# Patient Record
Sex: MALE | Race: WHITE | NOT HISPANIC OR LATINO | URBAN - METROPOLITAN AREA
[De-identification: names, ages, dates, MRNs, and addresses within clinical notes are randomized per-mention and may not be internally consistent; named-entity substitution may affect disease eponyms.]

---

## 2018-12-22 ENCOUNTER — OFFICE VISIT (OUTPATIENT)
Dept: URGENT CARE | Facility: CLINIC | Age: 68
End: 2018-12-22
Payer: MEDICARE

## 2018-12-22 VITALS
TEMPERATURE: 96 F | WEIGHT: 170 LBS | HEIGHT: 66 IN | SYSTOLIC BLOOD PRESSURE: 119 MMHG | BODY MASS INDEX: 27.32 KG/M2 | RESPIRATION RATE: 18 BRPM | DIASTOLIC BLOOD PRESSURE: 69 MMHG | HEART RATE: 59 BPM | OXYGEN SATURATION: 99 %

## 2018-12-22 DIAGNOSIS — R09.81 NASAL CONGESTION: ICD-10-CM

## 2018-12-22 DIAGNOSIS — H66.001 ACUTE SUPPURATIVE OTITIS MEDIA OF RIGHT EAR WITHOUT SPONTANEOUS RUPTURE OF TYMPANIC MEMBRANE, RECURRENCE NOT SPECIFIED: Primary | ICD-10-CM

## 2018-12-22 DIAGNOSIS — R05.9 COUGH: ICD-10-CM

## 2018-12-22 PROCEDURE — 99214 OFFICE O/P EST MOD 30 MIN: CPT | Mod: S$GLB,,, | Performed by: SURGERY

## 2018-12-22 RX ORDER — ESCITALOPRAM OXALATE 20 MG/1
20 TABLET ORAL DAILY
COMMUNITY

## 2018-12-22 RX ORDER — CLOPIDOGREL BISULFATE 75 MG/1
75 TABLET ORAL DAILY
COMMUNITY

## 2018-12-22 RX ORDER — LISINOPRIL 10 MG/1
10 TABLET ORAL 2 TIMES DAILY
COMMUNITY
End: 2019-02-18

## 2018-12-22 RX ORDER — AMOXICILLIN AND CLAVULANATE POTASSIUM 875; 125 MG/1; MG/1
1 TABLET, FILM COATED ORAL 2 TIMES DAILY
Qty: 14 TABLET | Refills: 0 | Status: SHIPPED | OUTPATIENT
Start: 2018-12-22 | End: 2018-12-29

## 2018-12-22 RX ORDER — IPRATROPIUM BROMIDE 42 UG/1
2 SPRAY, METERED NASAL 4 TIMES DAILY
Qty: 15 ML | Refills: 0 | Status: SHIPPED | OUTPATIENT
Start: 2018-12-22 | End: 2019-01-22 | Stop reason: SDUPTHER

## 2018-12-22 RX ORDER — AMLODIPINE BESYLATE 10 MG/1
10 TABLET ORAL DAILY
COMMUNITY

## 2018-12-22 RX ORDER — BENZONATATE 200 MG/1
200 CAPSULE ORAL 3 TIMES DAILY PRN
Qty: 21 CAPSULE | Refills: 0 | Status: SHIPPED | OUTPATIENT
Start: 2018-12-22 | End: 2018-12-29

## 2018-12-22 RX ORDER — OMEPRAZOLE 20 MG/1
20 CAPSULE, DELAYED RELEASE ORAL DAILY
COMMUNITY
End: 2019-02-18

## 2018-12-22 RX ORDER — ASPIRIN 81 MG/1
81 TABLET ORAL DAILY
COMMUNITY

## 2018-12-22 NOTE — PATIENT INSTRUCTIONS
Middle Ear Infection (Adult)  You have an infection of the middle ear, the space behind the eardrum. This is also called acute otitis media (AOM). Sometimes it is caused by the common cold. This is because congestion can block the internal passage (eustachian tube) that drains fluid from the middle ear. When the middle ear fills with fluid, bacteria can grow there and cause an infection. Oral antibiotics are used to treat this illness, not ear drops. Symptoms usually start to improve within 1 to 2 days of treatment.    Home care  The following are general care guidelines:  · Finish all of the antibiotic medicine given, even though you may feel better after the first few days.  · You may use over-the-counter medicine, such as acetaminophen or ibuprofen, to control pain and fever, unless something else was prescribed. If you have chronic liver or kidney disease or have ever had a stomach ulcer or gastrointestinal bleeding, talk with your healthcare provider before using these medicines. Do not give aspirin to anyone under 18 years of age who has a fever. It may cause severe illness or death.  Follow-up care  Follow up with your healthcare provider, or as advised, in 2 weeks if all symptoms have not gotten better, or if hearing doesn't go back to normal within 1 month.  When to seek medical advice  Call your healthcare provider right away if any of these occur:  · Ear pain gets worse or does not improve after 3 days of treatment  · Unusual drowsiness or confusion  · Neck pain, stiff neck, or headache  · Fluid or blood draining from the ear canal  · Fever of 100.4°F (38°C) or as advised   · Seizure  Date Last Reviewed: 6/1/2016  © 3149-8380 [employer]. [address] [place] [address], [place] [address]. All rights reserved. This information is not intended as a substitute for professional medical care. Always follow your healthcare professional's instructions.

## 2018-12-22 NOTE — PROGRESS NOTES
"Subjective:       Patient ID: Eric Pink is a 68 y.o. male.    Vitals:  height is 5' 6" (1.676 m) and weight is 77.1 kg (170 lb). His temperature is 96 °F (35.6 °C). His blood pressure is 119/69 and his pulse is 59 (abnormal). His respiration is 18 and oxygen saturation is 99%.     Chief Complaint: Cough    Pt states that his symptoms began yesterday and gradually worsening .Took coridin hp and helped sleep but did not help symtpoms. Right ear ache, stuffy nose and cough      Cough   This is a new problem. The current episode started yesterday. The problem has been gradually worsening. The problem occurs constantly. The cough is productive of sputum (light green). Associated symptoms include ear pain, myalgias and a sore throat. Pertinent negatives include no chills, eye redness, fever, hemoptysis, rash, shortness of breath or wheezing. He has tried OTC cough suppressant for the symptoms. The treatment provided mild relief.       Constitution: Negative for chills, sweating, fatigue and fever.   HENT: Positive for ear pain, congestion, sinus pain and sore throat. Negative for sinus pressure and voice change.    Neck: Negative for painful lymph nodes.   Eyes: Positive for eye itching. Negative for eye redness.   Respiratory: Positive for cough and sputum production. Negative for chest tightness, bloody sputum, COPD, shortness of breath, stridor, wheezing and asthma.    Gastrointestinal: Negative for nausea and vomiting.   Musculoskeletal: Positive for muscle ache.   Skin: Negative for rash.   Allergic/Immunologic: Negative for seasonal allergies and asthma.   Hematologic/Lymphatic: Negative for swollen lymph nodes.       Objective:      Physical Exam   Constitutional: He is oriented to person, place, and time. He appears well-developed and well-nourished. He is cooperative.  Non-toxic appearance. He does not appear ill. No distress.   HENT:   Head: Normocephalic and atraumatic.   Right Ear: Hearing, external " ear and ear canal normal. Tympanic membrane is injected and erythematous.   Left Ear: Hearing, tympanic membrane, external ear and ear canal normal.   Nose: Mucosal edema and rhinorrhea present. No nasal deformity. No epistaxis. Right sinus exhibits no maxillary sinus tenderness and no frontal sinus tenderness. Left sinus exhibits no maxillary sinus tenderness and no frontal sinus tenderness.   Mouth/Throat: Uvula is midline, oropharynx is clear and moist and mucous membranes are normal. No trismus in the jaw. Normal dentition. No uvula swelling. No posterior oropharyngeal erythema.   Eyes: Conjunctivae and lids are normal. No scleral icterus.   Sclera clear bilat   Neck: Trachea normal, full passive range of motion without pain and phonation normal. Neck supple.   Cardiovascular: Normal rate, regular rhythm, normal heart sounds, intact distal pulses and normal pulses.   Pulmonary/Chest: Effort normal and breath sounds normal. No respiratory distress.   Occasional dry cough   Abdominal: Soft. Normal appearance and bowel sounds are normal. He exhibits no distension. There is no tenderness.   Musculoskeletal: Normal range of motion. He exhibits no edema or deformity.   Neurological: He is alert and oriented to person, place, and time. He exhibits normal muscle tone. Coordination normal.   Skin: Skin is warm, dry and intact. He is not diaphoretic. No pallor.   Psychiatric: He has a normal mood and affect. His speech is normal and behavior is normal. Judgment and thought content normal. Cognition and memory are normal.   Nursing note and vitals reviewed.      Assessment:       1. Acute suppurative otitis media of right ear without spontaneous rupture of tympanic membrane, recurrence not specified    2. Cough    3. Nasal congestion        Plan:         Acute suppurative otitis media of right ear without spontaneous rupture of tympanic membrane, recurrence not specified  -     amoxicillin-clavulanate 875-125mg (AUGMENTIN)  875-125 mg per tablet; Take 1 tablet by mouth 2 (two) times daily. for 7 days  Dispense: 14 tablet; Refill: 0    Cough  -     benzonatate (TESSALON) 200 MG capsule; Take 1 capsule (200 mg total) by mouth 3 (three) times daily as needed for Cough.  Dispense: 21 capsule; Refill: 0    Nasal congestion  -     ipratropium (ATROVENT) 42 mcg (0.06 %) nasal spray; 2 sprays by Nasal route 4 (four) times daily.  Dispense: 15 mL; Refill: 0      Patient Instructions     Middle Ear Infection (Adult)  You have an infection of the middle ear, the space behind the eardrum. This is also called acute otitis media (AOM). Sometimes it is caused by the common cold. This is because congestion can block the internal passage (eustachian tube) that drains fluid from the middle ear. When the middle ear fills with fluid, bacteria can grow there and cause an infection. Oral antibiotics are used to treat this illness, not ear drops. Symptoms usually start to improve within 1 to 2 days of treatment.    Home care  The following are general care guidelines:  · Finish all of the antibiotic medicine given, even though you may feel better after the first few days.  · You may use over-the-counter medicine, such as acetaminophen or ibuprofen, to control pain and fever, unless something else was prescribed. If you have chronic liver or kidney disease or have ever had a stomach ulcer or gastrointestinal bleeding, talk with your healthcare provider before using these medicines. Do not give aspirin to anyone under 18 years of age who has a fever. It may cause severe illness or death.  Follow-up care  Follow up with your healthcare provider, or as advised, in 2 weeks if all symptoms have not gotten better, or if hearing doesn't go back to normal within 1 month.  When to seek medical advice  Call your healthcare provider right away if any of these occur:  · Ear pain gets worse or does not improve after 3 days of treatment  · Unusual drowsiness or  confusion  · Neck pain, stiff neck, or headache  · Fluid or blood draining from the ear canal  · Fever of 100.4°F (38°C) or as advised   · Seizure  Date Last Reviewed: 6/1/2016  © 4006-5250 [employer]. [address] [place] [address], [place] [address]. All rights reserved. This information is not intended as a substitute for professional medical care. Always follow your healthcare professional's instructions.

## 2019-01-22 DIAGNOSIS — R09.81 NASAL CONGESTION: ICD-10-CM

## 2019-01-22 RX ORDER — IPRATROPIUM BROMIDE 42 UG/1
SPRAY, METERED NASAL
Qty: 15 ML | Refills: 0 | Status: SHIPPED | OUTPATIENT
Start: 2019-01-22 | End: 2019-02-18

## 2019-02-18 ENCOUNTER — OFFICE VISIT (OUTPATIENT)
Dept: URGENT CARE | Facility: CLINIC | Age: 69
End: 2019-02-18
Payer: MEDICARE

## 2019-02-18 VITALS
HEART RATE: 69 BPM | TEMPERATURE: 99 F | DIASTOLIC BLOOD PRESSURE: 73 MMHG | SYSTOLIC BLOOD PRESSURE: 125 MMHG | BODY MASS INDEX: 27.44 KG/M2 | WEIGHT: 170 LBS | OXYGEN SATURATION: 97 %

## 2019-02-18 DIAGNOSIS — R52 BODY ACHES: ICD-10-CM

## 2019-02-18 DIAGNOSIS — J10.1 INFLUENZA A: Primary | ICD-10-CM

## 2019-02-18 DIAGNOSIS — J01.40 ACUTE NON-RECURRENT PANSINUSITIS: ICD-10-CM

## 2019-02-18 LAB
CTP QC/QA: YES
FLUAV AG NPH QL: POSITIVE
FLUBV AG NPH QL: NEGATIVE

## 2019-02-18 PROCEDURE — 99214 OFFICE O/P EST MOD 30 MIN: CPT | Mod: S$GLB,,, | Performed by: NURSE PRACTITIONER

## 2019-02-18 PROCEDURE — 99214 PR OFFICE/OUTPT VISIT, EST, LEVL IV, 30-39 MIN: ICD-10-PCS | Mod: S$GLB,,, | Performed by: NURSE PRACTITIONER

## 2019-02-18 PROCEDURE — 87804 INFLUENZA ASSAY W/OPTIC: CPT | Mod: QW,S$GLB,, | Performed by: NURSE PRACTITIONER

## 2019-02-18 PROCEDURE — 87804 POCT INFLUENZA A/B: ICD-10-PCS | Mod: QW,S$GLB,, | Performed by: NURSE PRACTITIONER

## 2019-02-18 RX ORDER — ATORVASTATIN CALCIUM 80 MG/1
80 TABLET, FILM COATED ORAL DAILY
Refills: 2 | COMMUNITY
Start: 2018-11-13

## 2019-02-18 RX ORDER — PANTOPRAZOLE SODIUM 40 MG/1
40 TABLET, DELAYED RELEASE ORAL DAILY
Refills: 3 | COMMUNITY
Start: 2018-12-28

## 2019-02-18 RX ORDER — AMOXICILLIN AND CLAVULANATE POTASSIUM 875; 125 MG/1; MG/1
1 TABLET, FILM COATED ORAL 2 TIMES DAILY
Qty: 14 TABLET | Refills: 0 | Status: SHIPPED | OUTPATIENT
Start: 2019-02-18 | End: 2019-02-25

## 2019-02-18 RX ORDER — ONDANSETRON 4 MG/1
4 TABLET, ORALLY DISINTEGRATING ORAL EVERY 6 HOURS PRN
Qty: 12 TABLET | Refills: 0 | Status: SHIPPED | OUTPATIENT
Start: 2019-02-18

## 2019-02-18 RX ORDER — OSELTAMIVIR PHOSPHATE 75 MG/1
75 CAPSULE ORAL 2 TIMES DAILY
Qty: 10 CAPSULE | Refills: 0 | Status: SHIPPED | OUTPATIENT
Start: 2019-02-18 | End: 2019-02-23

## 2019-02-18 RX ORDER — PROMETHAZINE HYDROCHLORIDE AND DEXTROMETHORPHAN HYDROBROMIDE 6.25; 15 MG/5ML; MG/5ML
5 SYRUP ORAL NIGHTLY PRN
Qty: 120 ML | Refills: 0 | Status: SHIPPED | OUTPATIENT
Start: 2019-02-18 | End: 2019-02-28

## 2019-02-18 NOTE — PROGRESS NOTES
Subjective:       Patient ID: Eric Pink is a 68 y.o. male.    Vitals:  weight is 77.1 kg (170 lb). His temperature is 98.7 °F (37.1 °C). His blood pressure is 125/73 and his pulse is 69. His oxygen saturation is 97%.     Chief Complaint: URI    Patient states he was seen a few weeks ago and his symptoms have not gotten any better they actually became worse yesterday.  Does state that he was exposed to the flu is his granddaughter is ill.  Did receive his flu vaccine this season.  Cough is keeping him awake at night.      URI    This is a new problem. The current episode started yesterday (3). The problem has been rapidly worsening. There has been no fever. Associated symptoms include congestion, coughing, ear pain, headaches and sinus pain. Pertinent negatives include no chest pain, diarrhea, dysuria, nausea, rash, sore throat or vomiting. He has tried nothing for the symptoms.       Constitution: Positive for fatigue. Negative for chills and fever.   HENT: Positive for ear pain, congestion, postnasal drip, sinus pain and sinus pressure. Negative for sore throat.         Fluid in ears   Neck: Negative for painful lymph nodes.   Cardiovascular: Negative for chest pain and leg swelling.   Eyes: Negative for double vision and blurred vision.   Respiratory: Positive for cough. Negative for shortness of breath.    Gastrointestinal: Negative for nausea, vomiting and diarrhea.   Genitourinary: Negative for dysuria, frequency and urgency.   Musculoskeletal: Positive for muscle ache. Negative for joint pain, joint swelling and muscle cramps.   Skin: Negative for color change, pale and rash.   Allergic/Immunologic: Negative for seasonal allergies.   Neurological: Positive for headaches. Negative for dizziness, history of vertigo, light-headedness and passing out.   Hematologic/Lymphatic: Negative for swollen lymph nodes, easy bruising/bleeding and history of blood clots. Does not bruise/bleed easily.    Psychiatric/Behavioral: Negative for nervous/anxious, sleep disturbance and depression. The patient is not nervous/anxious.        Objective:      Physical Exam   Constitutional: He is oriented to person, place, and time. Vital signs are normal. He appears well-developed and well-nourished. He is cooperative.  Non-toxic appearance. He does not have a sickly appearance. He does not appear ill. No distress.   HENT:   Head: Normocephalic and atraumatic.   Right Ear: Hearing, external ear and ear canal normal. A middle ear effusion is present.   Left Ear: Hearing, external ear and ear canal normal. A middle ear effusion is present.   Nose: Mucosal edema and rhinorrhea present. No nasal deformity. No epistaxis. Right sinus exhibits maxillary sinus tenderness and frontal sinus tenderness. Left sinus exhibits maxillary sinus tenderness and frontal sinus tenderness.   Mouth/Throat: Uvula is midline and mucous membranes are normal. No trismus in the jaw. Normal dentition. No uvula swelling. Posterior oropharyngeal erythema present.   Patient states symptoms never cleared up from his last visit a few weeks ago and thinks that the sinus infection is left over from that.   Eyes: Conjunctivae, EOM and lids are normal. Pupils are equal, round, and reactive to light. No scleral icterus.   Sclera clear bilat   Neck: Trachea normal, normal range of motion, full passive range of motion without pain and phonation normal. Neck supple. No spinous process tenderness and no muscular tenderness present. No neck rigidity. Normal range of motion present.   Cardiovascular: Normal rate, regular rhythm, normal heart sounds and normal pulses.   Pulmonary/Chest: Effort normal and breath sounds normal. No respiratory distress.   Abdominal: Soft. Normal appearance and bowel sounds are normal. He exhibits no distension. There is no tenderness.   Musculoskeletal: Normal range of motion. He exhibits no edema or deformity.   Lymphadenopathy:     He  has cervical adenopathy.        Right cervical: Superficial cervical adenopathy present.        Left cervical: Superficial cervical adenopathy present.   Neurological: He is alert and oriented to person, place, and time. He exhibits normal muscle tone. Coordination normal.   Skin: Skin is warm, dry and intact. Capillary refill takes less than 2 seconds. He is not diaphoretic. No pallor.   Psychiatric: He has a normal mood and affect. His speech is normal and behavior is normal. Judgment and thought content normal. Cognition and memory are normal.   Nursing note and vitals reviewed.      Results for orders placed or performed in visit on 02/18/19   POCT Influenza A/B   Result Value Ref Range    Rapid Influenza A Ag Positive (A) Negative    Rapid Influenza B Ag Negative Negative     Acceptable Yes      Assessment:       1. Influenza A    2. Acute non-recurrent pansinusitis    3. Body aches        Plan:         Influenza A  -     oseltamivir (TAMIFLU) 75 MG capsule; Take 1 capsule (75 mg total) by mouth 2 (two) times daily. for 5 days  Dispense: 10 capsule; Refill: 0  -     ondansetron (ZOFRAN-ODT) 4 MG TbDL; Take 1 tablet (4 mg total) by mouth every 6 (six) hours as needed (nausea).  Dispense: 12 tablet; Refill: 0  -     promethazine-dextromethorphan (PROMETHAZINE-DM) 6.25-15 mg/5 mL Syrp; Take 5 mLs by mouth nightly as needed. CAUTION: MAY CAUSE DROWSINESS  Dispense: 120 mL; Refill: 0    Acute non-recurrent pansinusitis  -     amoxicillin-clavulanate 875-125mg (AUGMENTIN) 875-125 mg per tablet; Take 1 tablet by mouth 2 (two) times daily. for 7 days  Dispense: 14 tablet; Refill: 0    Body aches  -     POCT Influenza A/B      Patient Instructions     Please drink plenty of fluids.  Please get plenty of rest.  Please return here or go to the Emergency Department for any concerns or worsening of condition.  Tamiflu prescription has been discussed and if prescribed, please take to completion unless you  cannot tolerate the side effects.   If you were prescribed a narcotic medication, do not drive or operate heavy equipment or machinery while taking these medications.  If you were given a steroid shot in the clinic and have also been given a prescription for a steroid such as Prednisone or a Medrol Dose Pack, please begin taking them tomorrow.  If you do not have Hypertension or any history of palpitations, it is ok to take over the counter Sudafed or Mucinex D or Allegra-D or Claritin-D or Zyrtec-D.  If you do take one of the above, it is ok to combine that with plain over the counter Mucinex or Allegra or Claritin or Zyrtec.  If for example you are taking Zyrtec -D, you can combine that with Mucinex, but not Mucinex-D.  If you are taking Mucinex-D, you can combine that with plain Allegra or Claritin or Zyrtec.   If you do have Hypertension or palpitations, it is safe to take Coricidin HBP for relief of sinus symptoms.  If not allergic, please take over the counter Tylenol (Acetaminophen) and/or Motrin (Ibuprofen) as directed for control of pain and/or fever.  Please follow up with your primary care doctor or specialist as needed.    If you  smoke, please stop smoking.    Influenza (Adult)    Influenza is also called the flu. It is a viral illness that affects the air passages of your lungs. It is different from the common cold. The flu can easily be passed from one to person to another. It may be spread through the air by coughing and sneezing. Or it can be spread by touching the sick person and then touching your own eyes, nose, or mouth.  The flu starts 1 to 3 days after you are exposed to the flu virus. It may last for 1 to 2 weeks but many people feel tired or fatigued for many weeks afterward. You usually dont need to take antibiotics unless you have a complication. This might be an ear or sinus infection or pneumonia.  Symptoms of the flu may be mild or severe. They can include extreme tiredness (wanting to  stay in bed all day), chills, fevers, muscle aches, soreness with eye movement, headache, and a dry, hacking cough.  Home care  Follow these guidelines when caring for yourself at home:  · Avoid being around cigarette smoke, whether yours or other peoples.  · Acetaminophen or ibuprofen will help ease your fever, muscle aches, and headache. Dont give aspirin to anyone younger than 18 who has the flu. Aspirin can harm the liver.  · Nausea and loss of appetite are common with the flu. Eat light meals. Drink 6 to 8 glasses of liquids every day. Good choices are water, sport drinks, soft drinks without caffeine, juices, tea, and soup. Extra fluids will also help loosen secretions in your nose and lungs.  · Over-the-counter cold medicines will not make the flu go away faster. But the medicines may help with coughing, sore throat, and congestion in your nose and sinuses. Dont use a decongestant if you have high blood pressure.  · Stay home until your fever has been gone for at least 24 hours without using medicine to reduce fever.  Follow-up care  Follow up with your healthcare provider, or as advised, if you are not getting better over the next week.  If you are age 65 or older, talk with your provider about getting a pneumococcal vaccine every 5 years. You should also get this vaccine if you have chronic asthma or COPD. All adults should get a flu vaccine every fall. Ask your provider about this.  When to seek medical advice  Call your healthcare provider right away if any of these occur:  · Cough with lots of colored mucus (sputum) or blood in your mucus  · Chest pain, shortness of breath, wheezing, or trouble breathing  · Severe headache, or face, neck, or ear pain  · New rash with fever  · Fever of 100.4°F (38°C) or higher, or as directed by your healthcare provider  · Confusion, behavior change, or seizure  · Severe weakness or dizziness  · You get a new fever or cough after getting better for a few days  Date  Last Reviewed: 1/1/2017  © 0814-1454 [employer]. [address] [place] [address], [place] [address]. All rights reserved. This information is not intended as a substitute for professional medical care. Always follow your healthcare professional's instructions.        Sinusitis (Antibiotic Treatment)    The sinuses are air-filled spaces within the bones of the face. They connect to the inside of the nose. Sinusitis is an inflammation of the tissue lining the sinus cavity. Sinus inflammation can occur during a cold. It can also be due to allergies to pollens and other particles in the air. Sinusitis can cause symptoms of sinus congestion and fullness. A sinus infection causes fever, headache and facial pain. There is often green or yellow drainage from the nose or into the back of the throat (post-nasal drip). You have been given antibiotics to treat this condition.  Home care:  · Take the full course of antibiotics as instructed. Do not stop taking them, even if you feel better.  · Drink plenty of water, hot tea, and other liquids. This may help thin mucus. It also may promote sinus drainage.  · Heat may help soothe painful areas of the face. Use a towel soaked in hot water. Or,  the shower and direct the hot spray onto your face. Using a vaporizer along with a menthol rub at night may also help.   · An expectorant containing guaifenesin may help thin the mucus and promote drainage from the sinuses.  · Over-the-counter decongestants may be used unless a similar medicine was prescribed. Nasal sprays work the fastest. Use one that contains phenylephrine or oxymetazoline. First blow the nose gently. Then use the spray. Do not use these medicines more often than directed on the label or symptoms may get worse. You may also use tablets containing pseudoephedrine. Avoid products that combine ingredients, because side effects may be increased. Read labels. You can also ask the pharmacist for help.  (NOTE: Persons with high blood pressure should not use decongestants. They can raise blood pressure.)  · Over-the-counter antihistamines may help if allergies contributed to your sinusitis.    · Do not use nasal rinses or irrigation during an acute sinus infection, unless told to by your health care provider. Rinsing may spread the infection to other sinuses.  · Use acetaminophen or ibuprofen to control pain, unless another pain medicine was prescribed. (If you have chronic liver or kidney disease or ever had a stomach ulcer, talk with your doctor before using these medicines. Aspirin should never be used in anyone under 18 years of age who is ill with a fever. It may cause severe liver damage.)  · Don't smoke. This can worsen symptoms.  Follow-up care  Follow up with your healthcare provider or our staff if you are not improving within the next week.  When to seek medical advice  Call your healthcare provider if any of these occur:  · Facial pain or headache becoming more severe  · Stiff neck  · Unusual drowsiness or confusion  · Swelling of the forehead or eyelids  · Vision problems, including blurred or double vision  · Fever of 100.4ºF (38ºC) or higher, or as directed by your healthcare provider  · Seizure  · Breathing problems  · Symptoms not resolving within 10 days  Date Last Reviewed: 4/13/2015  © 9647-2652 [employer]. [address] [place] [address], [place] [address]. All rights reserved. This information is not intended as a substitute for professional medical care. Always follow your healthcare professional's instructions.

## 2019-02-18 NOTE — PATIENT INSTRUCTIONS
Please drink plenty of fluids.  Please get plenty of rest.  Please return here or go to the Emergency Department for any concerns or worsening of condition.  Tamiflu prescription has been discussed and if prescribed, please take to completion unless you cannot tolerate the side effects.   If you were prescribed a narcotic medication, do not drive or operate heavy equipment or machinery while taking these medications.  If you were given a steroid shot in the clinic and have also been given a prescription for a steroid such as Prednisone or a Medrol Dose Pack, please begin taking them tomorrow.  If you do not have Hypertension or any history of palpitations, it is ok to take over the counter Sudafed or Mucinex D or Allegra-D or Claritin-D or Zyrtec-D.  If you do take one of the above, it is ok to combine that with plain over the counter Mucinex or Allegra or Claritin or Zyrtec.  If for example you are taking Zyrtec -D, you can combine that with Mucinex, but not Mucinex-D.  If you are taking Mucinex-D, you can combine that with plain Allegra or Claritin or Zyrtec.   If you do have Hypertension or palpitations, it is safe to take Coricidin HBP for relief of sinus symptoms.  If not allergic, please take over the counter Tylenol (Acetaminophen) and/or Motrin (Ibuprofen) as directed for control of pain and/or fever.  Please follow up with your primary care doctor or specialist as needed.    If you  smoke, please stop smoking.    Influenza (Adult)    Influenza is also called the flu. It is a viral illness that affects the air passages of your lungs. It is different from the common cold. The flu can easily be passed from one to person to another. It may be spread through the air by coughing and sneezing. Or it can be spread by touching the sick person and then touching your own eyes, nose, or mouth.  The flu starts 1 to 3 days after you are exposed to the flu virus. It may last for 1 to 2 weeks but many people feel tired  or fatigued for many weeks afterward. You usually dont need to take antibiotics unless you have a complication. This might be an ear or sinus infection or pneumonia.  Symptoms of the flu may be mild or severe. They can include extreme tiredness (wanting to stay in bed all day), chills, fevers, muscle aches, soreness with eye movement, headache, and a dry, hacking cough.  Home care  Follow these guidelines when caring for yourself at home:  · Avoid being around cigarette smoke, whether yours or other peoples.  · Acetaminophen or ibuprofen will help ease your fever, muscle aches, and headache. Dont give aspirin to anyone younger than 18 who has the flu. Aspirin can harm the liver.  · Nausea and loss of appetite are common with the flu. Eat light meals. Drink 6 to 8 glasses of liquids every day. Good choices are water, sport drinks, soft drinks without caffeine, juices, tea, and soup. Extra fluids will also help loosen secretions in your nose and lungs.  · Over-the-counter cold medicines will not make the flu go away faster. But the medicines may help with coughing, sore throat, and congestion in your nose and sinuses. Dont use a decongestant if you have high blood pressure.  · Stay home until your fever has been gone for at least 24 hours without using medicine to reduce fever.  Follow-up care  Follow up with your healthcare provider, or as advised, if you are not getting better over the next week.  If you are age 65 or older, talk with your provider about getting a pneumococcal vaccine every 5 years. You should also get this vaccine if you have chronic asthma or COPD. All adults should get a flu vaccine every fall. Ask your provider about this.  When to seek medical advice  Call your healthcare provider right away if any of these occur:  · Cough with lots of colored mucus (sputum) or blood in your mucus  · Chest pain, shortness of breath, wheezing, or trouble breathing  · Severe headache, or face, neck, or ear  pain  · New rash with fever  · Fever of 100.4°F (38°C) or higher, or as directed by your healthcare provider  · Confusion, behavior change, or seizure  · Severe weakness or dizziness  · You get a new fever or cough after getting better for a few days  Date Last Reviewed: 1/1/2017 © 2000-2017 [employer]. [address] [place] [address], [place] [address]. All rights reserved. This information is not intended as a substitute for professional medical care. Always follow your healthcare professional's instructions.        Sinusitis (Antibiotic Treatment)    The sinuses are air-filled spaces within the bones of the face. They connect to the inside of the nose. Sinusitis is an inflammation of the tissue lining the sinus cavity. Sinus inflammation can occur during a cold. It can also be due to allergies to pollens and other particles in the air. Sinusitis can cause symptoms of sinus congestion and fullness. A sinus infection causes fever, headache and facial pain. There is often green or yellow drainage from the nose or into the back of the throat (post-nasal drip). You have been given antibiotics to treat this condition.  Home care:  · Take the full course of antibiotics as instructed. Do not stop taking them, even if you feel better.  · Drink plenty of water, hot tea, and other liquids. This may help thin mucus. It also may promote sinus drainage.  · Heat may help soothe painful areas of the face. Use a towel soaked in hot water. Or,  the shower and direct the hot spray onto your face. Using a vaporizer along with a menthol rub at night may also help.   · An expectorant containing guaifenesin may help thin the mucus and promote drainage from the sinuses.  · Over-the-counter decongestants may be used unless a similar medicine was prescribed. Nasal sprays work the fastest. Use one that contains phenylephrine or oxymetazoline. First blow the nose gently. Then use the spray. Do not use these medicines more  often than directed on the label or symptoms may get worse. You may also use tablets containing pseudoephedrine. Avoid products that combine ingredients, because side effects may be increased. Read labels. You can also ask the pharmacist for help. (NOTE: Persons with high blood pressure should not use decongestants. They can raise blood pressure.)  · Over-the-counter antihistamines may help if allergies contributed to your sinusitis.    · Do not use nasal rinses or irrigation during an acute sinus infection, unless told to by your health care provider. Rinsing may spread the infection to other sinuses.  · Use acetaminophen or ibuprofen to control pain, unless another pain medicine was prescribed. (If you have chronic liver or kidney disease or ever had a stomach ulcer, talk with your doctor before using these medicines. Aspirin should never be used in anyone under 18 years of age who is ill with a fever. It may cause severe liver damage.)  · Don't smoke. This can worsen symptoms.  Follow-up care  Follow up with your healthcare provider or our staff if you are not improving within the next week.  When to seek medical advice  Call your healthcare provider if any of these occur:  · Facial pain or headache becoming more severe  · Stiff neck  · Unusual drowsiness or confusion  · Swelling of the forehead or eyelids  · Vision problems, including blurred or double vision  · Fever of 100.4ºF (38ºC) or higher, or as directed by your healthcare provider  · Seizure  · Breathing problems  · Symptoms not resolving within 10 days  Date Last Reviewed: 4/13/2015  © 7157-8074 [employer]. [address] [place] [address], [place] [address]. All rights reserved. This information is not intended as a substitute for professional medical care. Always follow your healthcare professional's instructions.

## 2021-09-24 ENCOUNTER — OFFICE VISIT (OUTPATIENT)
Dept: URGENT CARE | Facility: CLINIC | Age: 71
End: 2021-09-24
Payer: MEDICARE

## 2021-09-24 VITALS
BODY MASS INDEX: 27.32 KG/M2 | OXYGEN SATURATION: 96 % | HEIGHT: 66 IN | RESPIRATION RATE: 18 BRPM | TEMPERATURE: 98 F | WEIGHT: 170 LBS | SYSTOLIC BLOOD PRESSURE: 153 MMHG | HEART RATE: 71 BPM | DIASTOLIC BLOOD PRESSURE: 93 MMHG

## 2021-09-24 DIAGNOSIS — H01.009 BLEPHARITIS, UNSPECIFIED LATERALITY, UNSPECIFIED TYPE: ICD-10-CM

## 2021-09-24 DIAGNOSIS — T15.92XA FOREIGN BODY, EYE, LEFT, INITIAL ENCOUNTER: Primary | ICD-10-CM

## 2021-09-24 PROCEDURE — 1160F PR REVIEW ALL MEDS BY PRESCRIBER/CLIN PHARMACIST DOCUMENTED: ICD-10-PCS | Mod: CPTII,S$GLB,,

## 2021-09-24 PROCEDURE — 3080F PR MOST RECENT DIASTOLIC BLOOD PRESSURE >= 90 MM HG: ICD-10-PCS | Mod: CPTII,S$GLB,,

## 2021-09-24 PROCEDURE — 99213 OFFICE O/P EST LOW 20 MIN: CPT | Mod: S$GLB,,,

## 2021-09-24 PROCEDURE — 3008F BODY MASS INDEX DOCD: CPT | Mod: CPTII,S$GLB,,

## 2021-09-24 PROCEDURE — 3080F DIAST BP >= 90 MM HG: CPT | Mod: CPTII,S$GLB,,

## 2021-09-24 PROCEDURE — 1159F PR MEDICATION LIST DOCUMENTED IN MEDICAL RECORD: ICD-10-PCS | Mod: CPTII,S$GLB,,

## 2021-09-24 PROCEDURE — 1160F RVW MEDS BY RX/DR IN RCRD: CPT | Mod: CPTII,S$GLB,,

## 2021-09-24 PROCEDURE — 1159F MED LIST DOCD IN RCRD: CPT | Mod: CPTII,S$GLB,,

## 2021-09-24 PROCEDURE — 3008F PR BODY MASS INDEX (BMI) DOCUMENTED: ICD-10-PCS | Mod: CPTII,S$GLB,,

## 2021-09-24 PROCEDURE — 3077F PR MOST RECENT SYSTOLIC BLOOD PRESSURE >= 140 MM HG: ICD-10-PCS | Mod: CPTII,S$GLB,,

## 2021-09-24 PROCEDURE — 99213 PR OFFICE/OUTPT VISIT, EST, LEVL III, 20-29 MIN: ICD-10-PCS | Mod: S$GLB,,,

## 2021-09-24 PROCEDURE — 3077F SYST BP >= 140 MM HG: CPT | Mod: CPTII,S$GLB,,

## 2021-09-24 RX ORDER — ERYTHROMYCIN 5 MG/G
OINTMENT OPHTHALMIC NIGHTLY
Qty: 1 TUBE | Refills: 0 | Status: SHIPPED | OUTPATIENT
Start: 2021-09-24 | End: 2021-10-04

## 2021-09-24 RX ORDER — LISINOPRIL 10 MG/1
10 TABLET ORAL
COMMUNITY

## 2021-09-24 RX ORDER — ERYTHROMYCIN 5 MG/G
OINTMENT OPHTHALMIC
Status: DISCONTINUED | OUTPATIENT
Start: 2021-09-24 | End: 2021-09-24

## 2021-09-24 RX ORDER — HYDROCODONE BITARTRATE AND ACETAMINOPHEN 5; 325 MG/1; MG/1
TABLET ORAL
COMMUNITY
Start: 2021-09-16

## 2021-09-24 RX ORDER — OMEPRAZOLE 20 MG/1
20 TABLET, DELAYED RELEASE ORAL
COMMUNITY

## 2023-01-05 ENCOUNTER — HOSPITAL ENCOUNTER (EMERGENCY)
Facility: HOSPITAL | Age: 73
Discharge: HOME | End: 2023-01-05
Attending: EMERGENCY MEDICINE
Payer: MEDICARE

## 2023-01-05 VITALS
WEIGHT: 170 LBS | BODY MASS INDEX: 27.32 KG/M2 | HEART RATE: 82 BPM | DIASTOLIC BLOOD PRESSURE: 81 MMHG | SYSTOLIC BLOOD PRESSURE: 139 MMHG | RESPIRATION RATE: 17 BRPM | HEIGHT: 66 IN | OXYGEN SATURATION: 97 % | TEMPERATURE: 98 F

## 2023-01-05 DIAGNOSIS — S61.211A LACERATION OF LEFT INDEX FINGER WITHOUT FOREIGN BODY WITHOUT DAMAGE TO NAIL, INITIAL ENCOUNTER: Primary | ICD-10-CM

## 2023-01-05 PROCEDURE — 12001 RPR S/N/AX/GEN/TRNK 2.5CM/<: CPT

## 2023-01-05 PROCEDURE — 90715 TDAP VACCINE 7 YRS/> IM: CPT

## 2023-01-05 PROCEDURE — 25000003 PHARM REV CODE 250: Performed by: PHYSICIAN ASSISTANT

## 2023-01-05 PROCEDURE — 63600175 PHARM REV CODE 636 W HCPCS

## 2023-01-05 PROCEDURE — 99284 EMERGENCY DEPT VISIT MOD MDM: CPT | Mod: 25

## 2023-01-05 PROCEDURE — 90471 IMMUNIZATION ADMIN: CPT

## 2023-01-05 RX ORDER — LIDOCAINE HYDROCHLORIDE 10 MG/ML
10 INJECTION INFILTRATION; PERINEURAL
Status: COMPLETED | OUTPATIENT
Start: 2023-01-05 | End: 2023-01-05

## 2023-01-05 RX ADMIN — LIDOCAINE HYDROCHLORIDE 10 ML: 10 INJECTION, SOLUTION INFILTRATION; PERINEURAL at 05:01

## 2023-01-05 RX ADMIN — TETANUS TOXOID, REDUCED DIPHTHERIA TOXOID AND ACELLULAR PERTUSSIS VACCINE, ADSORBED 0.5 ML: 5; 2.5; 8; 8; 2.5 SUSPENSION INTRAMUSCULAR at 06:01

## 2023-01-05 NOTE — ED PROVIDER NOTES
Encounter Date: 1/5/2023    SCRIBE #1 NOTE: I, Miguelina Garcia, am scribing for, and in the presence of,  Brittney Cortez PA-C. I have scribed the following portions of the note - Other sections scribed: HPI, ROS.     History     Chief Complaint   Patient presents with    Laceration     Pt states he was reaching for something in the trunk of hi scar when he accidentally cut her left index finger on a knife. Pt on Plavix     72 y.o male patient with PMHx of HTN and CVA presents to ED with chief complaint of laceration on left index finger around 3:30 PM today when working on motor vehicle.  He states that he was trying to grab a tool out of a box; however, he did not realize there was a knife in there and accidentally cut himself.  He does not recall being up to date with tetanus vaccine. Patient is currently taking Plavix. Denies any fever, chills, chest pain, back pain, neck pain, abdominal pain, headaches, cough, sore throat, congestion, rhinorrhea, ear pain, eye pain, rash, nausea, vomiting, diarrhea, dysuria, or any other associated sypmptoms. No prior Tx. No alleviating or aggravating factors. No known drug allergies.      The history is provided by the patient. No  was used.   Review of patient's allergies indicates:   Allergen Reactions    Betadine surgi-prep Blisters     Past Medical History:   Diagnosis Date    GERD (gastroesophageal reflux disease)     Hypertension     Stroke      Past Surgical History:   Procedure Laterality Date    GASTRIC RESTRICTION SURGERY      TELE-STROKE LABS       History reviewed. No pertinent family history.  Social History     Tobacco Use    Smoking status: Former    Smokeless tobacco: Former   Substance Use Topics    Alcohol use: No    Drug use: No     Review of Systems   Constitutional:  Negative for chills and fever.   HENT:  Negative for ear pain, rhinorrhea and sore throat.    Eyes:  Negative for pain.   Respiratory:  Negative for cough.    Cardiovascular:   Negative for chest pain.   Gastrointestinal:  Negative for abdominal pain, diarrhea, nausea and vomiting.   Genitourinary:  Negative for dysuria.   Musculoskeletal:  Negative for back pain and neck pain.   Skin:  Positive for wound (Laceration on L index finger). Negative for rash.   Neurological:  Negative for headaches.     Physical Exam     Initial Vitals [01/05/23 1556]   BP Pulse Resp Temp SpO2   (!) 145/82 85 17 97.5 °F (36.4 °C) 97 %      MAP       --         Physical Exam    Nursing note and vitals reviewed.  Constitutional: He appears well-developed and well-nourished. He is not diaphoretic.  Non-toxic appearance. No distress.   HENT:   Head: Normocephalic and atraumatic.   Right Ear: External ear normal.   Left Ear: External ear normal.   Nose: Nose normal.   Mouth/Throat: Uvula is midline and oropharynx is clear and moist.   Eyes: Conjunctivae and EOM are normal.   Neck: Neck supple.   Normal range of motion.   Full passive range of motion without pain.     Cardiovascular:            Pulses:       Radial pulses are 2+ on the right side and 2+ on the left side.   Musculoskeletal:      Cervical back: Full passive range of motion without pain, normal range of motion and neck supple. No rigidity.      Comments: Full range of motion of bilateral fingers, wrists, elbows.  Strength and sensation intact bilateral upper extremities.     Neurological: He is alert.   Skin: Skin is warm and dry. No rash noted.   1 cm laceration to distal pad of left index finger.  Bleeding controlled.         ED Course   Lac Repair    Date/Time: 1/5/2023 7:31 PM  Performed by: Brittney Cortez PA-C  Authorized by: Santo Mccartney MD     Consent:     Consent obtained:  Verbal    Consent given by:  Patient    Risks, benefits, and alternatives were discussed: yes    Anesthesia:     Anesthesia method:  None  Laceration details:     Location:  Finger    Finger location:  L index finger    Length (cm):  1  Exploration:     Limited  defect created (wound extended): yes      Hemostasis achieved with:  Direct pressure    Imaging obtained: x-ray      Imaging outcome: foreign body not noted      Wound exploration: wound explored through full range of motion and entire depth of wound visualized      Contaminated: no    Treatment:     Area cleansed with:  Saline    Amount of cleaning:  Standard    Irrigation solution:  Sterile saline    Visualized foreign bodies/material removed: yes    Skin repair:     Repair method:  Tissue adhesive  Approximation:     Approximation:  Close  Repair type:     Repair type:  Simple  Post-procedure details:     Dressing:  Open (no dressing)    Procedure completion:  Tolerated well, no immediate complications  Labs Reviewed - No data to display       Imaging Results              X-Ray Hand 3 view Left (Final result)  Result time 01/05/23 17:22:54      Final result by Ousmane Powell MD (01/05/23 17:22:54)                   Impression:      No evidence of a fracture or dislocation.    Soft tissue swelling overlying the left index finger with bandage in place.      Electronically signed by: Ousmane Powell MD  Date:    01/05/2023  Time:    17:22               Narrative:    EXAMINATION:  XR HAND COMPLETE 3 VIEW LEFT    CLINICAL HISTORY:  Hand pain.    TECHNIQUE:  PA, lateral, and oblique views of the left hand were performed.    COMPARISON:  None    FINDINGS:  The bone mineralization is within normal limits.  There is no cortical step-off.  There is no evidence of periostitis.    There is joint space narrowing.  There is a bandage overlying the left index finger.  No radiopaque foreign body is identified.    There is no evidence of a fracture or dislocation.                                       Medications   LIDOcaine HCL 10 mg/ml (1%) injection 10 mL (10 mLs Infiltration Given 1/5/23 1714)   Tdap (BOOSTRIX) vaccine injection 0.5 mL (0.5 mLs Intramuscular Given 1/5/23 1805)     Medical Decision Making:   Clinical Tests:    Radiological Study: Ordered and Reviewed  ED Management:  This is a 72 y.o male patient with PMHx of HTN and CVA presents to ED with chief complaint of laceration on left index finger around 3:30 PM today when working on motor vehicle. On physical exam, patient is well-appearing and in no acute distress.  Nontoxic appearing.  Lungs are clear to auscultation bilaterally.  Abdomen is soft and nontender.  No guarding, rigidity, rebound.  2+ radial pulses bilaterally.  1 cm laceration to distal pad of left index finger.  Bleeding controlled.Full range of motion of bilateral fingers, wrists, elbows.  Strength and sensation intact bilateral upper extremities.  X-rays revealed No evidence of a fracture or dislocation. Soft tissue swelling overlying the left index finger with bandage in place.  Bleeding controlled with direct pressure.  Tetanus updated.  Laceration repaired with Dermabond.  Patient tolerated the procedure well with no acute complications.  Wound margins are well approximated.  No surrounding erythema or cellulitis. Bleeding controlled.  Advised patient to take Tylenol ibuprofen at home as needed for pain.  Urged prompt follow-up with PCP for further evaluation.    Strict return precautions given. I discussed with the patient/family the diagnosis, treatment plan, indications for return to the emergency department, and for expected follow-up. The patient/family verbalized an understanding. The patient/family is asked if there are any questions or concerns. We discuss the case, until all issues are addressed to the patient/family's satisfaction. Patient/family understands and is agreeable to the plan. Patient is stable and ready for discharge.                          Clinical Impression:   Final diagnoses:  [V42.829X] Laceration of left index finger without foreign body without damage to nail, initial encounter (Primary)   IBrittney, personally performed the services described in this documentation.  All medical record entries made by the scribe were at my direction and in my presence. I have reviewed the chart and agree that the record reflects my personal performance and is accurate and complete.       ED Disposition Condition    Discharge Stable          ED Prescriptions    None       Follow-up Information       Follow up With Specialties Details Why Contact Info    Soy Worley MD Family Medicine In 2 days for further evaluation [address] [place] [address]  [id number]  [facility] [id number] [phone]      [facility] - Emergency Dept Emergency Medicine In 2 days If symptoms worsen [address] [facility] [id number] [phone]             Brittney Cortez PA-C  01/05/23 1933

## 2023-01-05 NOTE — ED TRIAGE NOTES
Patient arrived to the ER via personal transport w CC: Laceration (Pt states he was reaching for something in the trunk of hi scar when he accidentally cut her left index finger on a knife. Pt on Plavix)

## 2023-01-05 NOTE — DISCHARGE INSTRUCTIONS

## 2024-05-01 ENCOUNTER — HOSPITAL ENCOUNTER (OUTPATIENT)
Facility: HOSPITAL | Age: 74
Discharge: HOME | End: 2024-05-03
Attending: EMERGENCY MEDICINE | Admitting: EMERGENCY MEDICINE
Payer: MEDICARE

## 2024-05-01 DIAGNOSIS — R00.0 TACHYCARDIA: ICD-10-CM

## 2024-05-01 DIAGNOSIS — I10 HYPERTENSION, UNSPECIFIED TYPE: ICD-10-CM

## 2024-05-01 DIAGNOSIS — E78.5 HYPERLIPIDEMIA, UNSPECIFIED HYPERLIPIDEMIA TYPE: ICD-10-CM

## 2024-05-01 DIAGNOSIS — R07.9 CHEST PAIN: Primary | ICD-10-CM

## 2024-05-01 DIAGNOSIS — R07.9 CHEST PAIN, UNSPECIFIED TYPE: ICD-10-CM

## 2024-05-01 DIAGNOSIS — I71.21 ASCENDING AORTIC ANEURYSM, UNSPECIFIED WHETHER RUPTURED: ICD-10-CM

## 2024-05-01 DIAGNOSIS — E87.6 HYPOKALEMIA: ICD-10-CM

## 2024-05-01 DIAGNOSIS — E83.42 HYPOMAGNESEMIA: ICD-10-CM

## 2024-05-01 DIAGNOSIS — Z86.73 HISTORY OF CVA (CEREBROVASCULAR ACCIDENT): ICD-10-CM

## 2024-05-01 DIAGNOSIS — I25.10 CAD (CORONARY ARTERY DISEASE): ICD-10-CM

## 2024-05-01 LAB
ALBUMIN SERPL BCP-MCNC: 2.6 G/DL (ref 3.5–5.2)
ALP SERPL-CCNC: 39 U/L (ref 55–135)
ALT SERPL W/O P-5'-P-CCNC: 7 U/L (ref 10–44)
ANION GAP SERPL CALC-SCNC: 5 MMOL/L (ref 8–16)
ASCENDING AORTA: 3.97 CM
AST SERPL-CCNC: 14 U/L (ref 10–40)
AV INDEX (PROSTH): 0.7
AV MEAN GRADIENT: 16 MMHG
AV PEAK GRADIENT: 23 MMHG
AV VALVE AREA BY VELOCITY RATIO: 2.02 CM²
AV VALVE AREA: 1.78 CM²
AV VELOCITY RATIO: 0.79
BASOPHILS # BLD AUTO: 0.04 K/UL (ref 0–0.2)
BASOPHILS NFR BLD: 0.5 % (ref 0–1.9)
BILIRUB SERPL-MCNC: 0.4 MG/DL (ref 0.1–1)
BNP SERPL-MCNC: <10 PG/ML (ref 0–99)
BSA FOR ECHO PROCEDURE: 1.87 M2
BUN SERPL-MCNC: 11 MG/DL (ref 8–23)
CALCIUM SERPL-MCNC: 7.8 MG/DL (ref 8.7–10.5)
CHLORIDE SERPL-SCNC: 120 MMOL/L (ref 95–110)
CO2 SERPL-SCNC: 18 MMOL/L (ref 23–29)
CREAT SERPL-MCNC: 0.7 MG/DL (ref 0.5–1.4)
CV ECHO LV RWT: 0.8 CM
D DIMER PPP IA.FEU-MCNC: 0.4 MG/L FEU
DIFFERENTIAL METHOD BLD: ABNORMAL
DOP CALC AO PEAK VEL: 2.39 M/S
DOP CALC AO VTI: 51.6 CM
DOP CALC LVOT AREA: 2.5 CM2
DOP CALC LVOT DIAMETER: 1.8 CM
DOP CALC LVOT PEAK VEL: 1.9 M/S
DOP CALC LVOT STROKE VOLUME: 91.82 CM3
DOP CALC MV VTI: 31 CM
DOP CALCLVOT PEAK VEL VTI: 36.1 CM
E WAVE DECELERATION TIME: 385.27 MSEC
E/A RATIO: 0.62
E/E' RATIO: 9 M/S
ECHO LV POSTERIOR WALL: 1.43 CM (ref 0.6–1.1)
EOSINOPHIL # BLD AUTO: 0.1 K/UL (ref 0–0.5)
EOSINOPHIL NFR BLD: 0.8 % (ref 0–8)
ERYTHROCYTE [DISTWIDTH] IN BLOOD BY AUTOMATED COUNT: 13.2 % (ref 11.5–14.5)
EST. GFR  (NO RACE VARIABLE): >60 ML/MIN/1.73 M^2
FRACTIONAL SHORTENING: 35 % (ref 28–44)
GLUCOSE SERPL-MCNC: 90 MG/DL (ref 70–110)
HCT VFR BLD AUTO: 30.7 % (ref 40–54)
HGB BLD-MCNC: 10.5 G/DL (ref 14–18)
IMM GRANULOCYTES # BLD AUTO: 0.02 K/UL (ref 0–0.04)
IMM GRANULOCYTES NFR BLD AUTO: 0.2 % (ref 0–0.5)
INTERVENTRICULAR SEPTUM: 1.39 CM (ref 0.6–1.1)
IVC DIAMETER: 1.99 CM
IVRT: 106.57 MSEC
LA MAJOR: 5.78 CM
LA MINOR: 4.91 CM
LA WIDTH: 2.9 CM
LEFT ATRIUM SIZE: 3.45 CM
LEFT ATRIUM VOLUME INDEX: 24.5 ML/M2
LEFT ATRIUM VOLUME: 45.15 CM3
LEFT INTERNAL DIMENSION IN SYSTOLE: 2.31 CM (ref 2.1–4)
LEFT VENTRICLE DIASTOLIC VOLUME INDEX: 28.99 ML/M2
LEFT VENTRICLE DIASTOLIC VOLUME: 53.34 ML
LEFT VENTRICLE MASS INDEX: 98 G/M2
LEFT VENTRICLE SYSTOLIC VOLUME INDEX: 10 ML/M2
LEFT VENTRICLE SYSTOLIC VOLUME: 18.37 ML
LEFT VENTRICULAR INTERNAL DIMENSION IN DIASTOLE: 3.57 CM (ref 3.5–6)
LEFT VENTRICULAR MASS: 179.83 G
LV LATERAL E/E' RATIO: 8 M/S
LV SEPTAL E/E' RATIO: 10.29 M/S
LVOT MG: 10.1 MMHG
LVOT MV: 1.5 CM/S
LYMPHOCYTES # BLD AUTO: 1.6 K/UL (ref 1–4.8)
LYMPHOCYTES NFR BLD: 18.6 % (ref 18–48)
MAGNESIUM SERPL-MCNC: 1.1 MG/DL (ref 1.6–2.6)
MCH RBC QN AUTO: 31.6 PG (ref 27–31)
MCHC RBC AUTO-ENTMCNC: 34.2 G/DL (ref 32–36)
MCV RBC AUTO: 93 FL (ref 82–98)
MONOCYTES # BLD AUTO: 0.7 K/UL (ref 0.3–1)
MONOCYTES NFR BLD: 7.7 % (ref 4–15)
MV MEAN GRADIENT: 2 MMHG
MV PEAK A VEL: 1.16 M/S
MV PEAK E VEL: 0.72 M/S
MV PEAK GRADIENT: 6 MMHG
MV STENOSIS PRESSURE HALF TIME: 111.73 MS
MV VALVE AREA BY CONTINUITY EQUATION: 2.96 CM2
MV VALVE AREA P 1/2 METHOD: 1.97 CM2
NEUTROPHILS # BLD AUTO: 6.3 K/UL (ref 1.8–7.7)
NEUTROPHILS NFR BLD: 72.2 % (ref 38–73)
NRBC BLD-RTO: 0 /100 WBC
OHS CV RV/LV RATIO: 0.73 CM
OHS QRS DURATION: 80 MS
OHS QTC CALCULATION: 447 MS
PISA TR MAX VEL: 2.4 M/S
PLATELET # BLD AUTO: 166 K/UL (ref 150–450)
PMV BLD AUTO: 10.2 FL (ref 9.2–12.9)
POTASSIUM SERPL-SCNC: 2.9 MMOL/L (ref 3.5–5.1)
PROT SERPL-MCNC: 4.5 G/DL (ref 6–8.4)
PULM VEIN S/D RATIO: 2.18
PV PEAK D VEL: 0.22 M/S
PV PEAK GRADIENT: 5 MMHG
PV PEAK S VEL: 0.48 M/S
PV PEAK VELOCITY: 1.15 M/S
RA MAJOR: 4.77 CM
RA PRESSURE ESTIMATED: 3 MMHG
RA WIDTH: 3 CM
RBC # BLD AUTO: 3.32 M/UL (ref 4.6–6.2)
RIGHT VENTRICULAR END-DIASTOLIC DIMENSION: 2.62 CM
RV TB RVSP: 5 MMHG
RV TISSUE DOPPLER FREE WALL SYSTOLIC VELOCITY 1 (APICAL 4 CHAMBER VIEW): 13.23 CM/S
SINUS: 3.91 CM
SODIUM SERPL-SCNC: 143 MMOL/L (ref 136–145)
STJ: 3.38 CM
TDI LATERAL: 0.09 M/S
TDI SEPTAL: 0.07 M/S
TDI: 0.08 M/S
TR MAX PG: 23 MMHG
TRICUSPID ANNULAR PLANE SYSTOLIC EXCURSION: 2.09 CM
TROPONIN I SERPL DL<=0.01 NG/ML-MCNC: <0.006 NG/ML (ref 0–0.03)
TV REST PULMONARY ARTERY PRESSURE: 26 MMHG
WBC # BLD AUTO: 8.66 K/UL (ref 3.9–12.7)
Z-SCORE OF LEFT VENTRICULAR DIMENSION IN END DIASTOLE: -3.58
Z-SCORE OF LEFT VENTRICULAR DIMENSION IN END SYSTOLE: -2.45

## 2024-05-01 PROCEDURE — 93010 ELECTROCARDIOGRAM REPORT: CPT | Mod: 76,,, | Performed by: INTERNAL MEDICINE

## 2024-05-01 PROCEDURE — 84484 ASSAY OF TROPONIN QUANT: CPT | Mod: 91 | Performed by: INTERNAL MEDICINE

## 2024-05-01 PROCEDURE — 83735 ASSAY OF MAGNESIUM: CPT | Performed by: EMERGENCY MEDICINE

## 2024-05-01 PROCEDURE — 25000003 PHARM REV CODE 250: Performed by: PHYSICIAN ASSISTANT

## 2024-05-01 PROCEDURE — 96365 THER/PROPH/DIAG IV INF INIT: CPT | Mod: 59

## 2024-05-01 PROCEDURE — 63600175 PHARM REV CODE 636 W HCPCS: Performed by: PHYSICIAN ASSISTANT

## 2024-05-01 PROCEDURE — 93010 ELECTROCARDIOGRAM REPORT: CPT | Mod: ,,, | Performed by: INTERNAL MEDICINE

## 2024-05-01 PROCEDURE — 84484 ASSAY OF TROPONIN QUANT: CPT | Mod: 91 | Performed by: PHYSICIAN ASSISTANT

## 2024-05-01 PROCEDURE — 85025 COMPLETE CBC W/AUTO DIFF WBC: CPT | Performed by: EMERGENCY MEDICINE

## 2024-05-01 PROCEDURE — 96372 THER/PROPH/DIAG INJ SC/IM: CPT | Mod: 59 | Performed by: EMERGENCY MEDICINE

## 2024-05-01 PROCEDURE — 96375 TX/PRO/DX INJ NEW DRUG ADDON: CPT

## 2024-05-01 PROCEDURE — 84484 ASSAY OF TROPONIN QUANT: CPT | Performed by: EMERGENCY MEDICINE

## 2024-05-01 PROCEDURE — 99285 EMERGENCY DEPT VISIT HI MDM: CPT | Mod: 25

## 2024-05-01 PROCEDURE — 99214 OFFICE O/P EST MOD 30 MIN: CPT | Mod: 25,,, | Performed by: INTERNAL MEDICINE

## 2024-05-01 PROCEDURE — 96367 TX/PROPH/DG ADDL SEQ IV INF: CPT

## 2024-05-01 PROCEDURE — 93005 ELECTROCARDIOGRAM TRACING: CPT

## 2024-05-01 PROCEDURE — G0378 HOSPITAL OBSERVATION PER HR: HCPCS

## 2024-05-01 PROCEDURE — 96366 THER/PROPH/DIAG IV INF ADDON: CPT

## 2024-05-01 PROCEDURE — 25500020 PHARM REV CODE 255: Performed by: EMERGENCY MEDICINE

## 2024-05-01 PROCEDURE — 83880 ASSAY OF NATRIURETIC PEPTIDE: CPT | Performed by: EMERGENCY MEDICINE

## 2024-05-01 PROCEDURE — 85379 FIBRIN DEGRADATION QUANT: CPT | Performed by: EMERGENCY MEDICINE

## 2024-05-01 PROCEDURE — 63600175 PHARM REV CODE 636 W HCPCS: Performed by: EMERGENCY MEDICINE

## 2024-05-01 PROCEDURE — 25000003 PHARM REV CODE 250: Performed by: EMERGENCY MEDICINE

## 2024-05-01 PROCEDURE — 99204 OFFICE O/P NEW MOD 45 MIN: CPT | Mod: GC,,, | Performed by: NURSE PRACTITIONER

## 2024-05-01 PROCEDURE — 96376 TX/PRO/DX INJ SAME DRUG ADON: CPT | Mod: 59

## 2024-05-01 PROCEDURE — C9113 INJ PANTOPRAZOLE SODIUM, VIA: HCPCS | Performed by: PHYSICIAN ASSISTANT

## 2024-05-01 PROCEDURE — 80053 COMPREHEN METABOLIC PANEL: CPT | Performed by: EMERGENCY MEDICINE

## 2024-05-01 RX ORDER — CLONIDINE 0.1 MG/24H
1 PATCH, EXTENDED RELEASE TRANSDERMAL
COMMUNITY

## 2024-05-01 RX ORDER — ATORVASTATIN CALCIUM 40 MG/1
80 TABLET, FILM COATED ORAL DAILY
Status: DISCONTINUED | OUTPATIENT
Start: 2024-05-02 | End: 2024-05-03 | Stop reason: HOSPADM

## 2024-05-01 RX ORDER — GLUCAGON 1 MG
1 KIT INJECTION
Status: DISCONTINUED | OUTPATIENT
Start: 2024-05-01 | End: 2024-05-03 | Stop reason: HOSPADM

## 2024-05-01 RX ORDER — LEVETIRACETAM 500 MG/1
1000 TABLET ORAL
Status: COMPLETED | OUTPATIENT
Start: 2024-05-01 | End: 2024-05-01

## 2024-05-01 RX ORDER — ACETAMINOPHEN 325 MG/1
650 TABLET ORAL EVERY 4 HOURS PRN
Status: DISCONTINUED | OUTPATIENT
Start: 2024-05-01 | End: 2024-05-03 | Stop reason: HOSPADM

## 2024-05-01 RX ORDER — SODIUM CHLORIDE 0.9 % (FLUSH) 0.9 %
10 SYRINGE (ML) INJECTION
Status: DISCONTINUED | OUTPATIENT
Start: 2024-05-01 | End: 2024-05-03 | Stop reason: HOSPADM

## 2024-05-01 RX ORDER — DIPHENHYDRAMINE HYDROCHLORIDE 50 MG/ML
50 INJECTION INTRAMUSCULAR; INTRAVENOUS ONCE
Status: COMPLETED | OUTPATIENT
Start: 2024-05-01 | End: 2024-05-01

## 2024-05-01 RX ORDER — LISINOPRIL 20 MG/1
20 TABLET ORAL 2 TIMES DAILY
COMMUNITY

## 2024-05-01 RX ORDER — IBUPROFEN 200 MG
16 TABLET ORAL
Status: DISCONTINUED | OUTPATIENT
Start: 2024-05-01 | End: 2024-05-03 | Stop reason: HOSPADM

## 2024-05-01 RX ORDER — SODIUM CHLORIDE 0.9 % (FLUSH) 0.9 %
10 SYRINGE (ML) INJECTION EVERY 8 HOURS
Status: DISCONTINUED | OUTPATIENT
Start: 2024-05-01 | End: 2024-05-01

## 2024-05-01 RX ORDER — IBUPROFEN 200 MG
24 TABLET ORAL
Status: DISCONTINUED | OUTPATIENT
Start: 2024-05-01 | End: 2024-05-03 | Stop reason: HOSPADM

## 2024-05-01 RX ORDER — LANOLIN ALCOHOL/MO/W.PET/CERES
800 CREAM (GRAM) TOPICAL
Status: DISCONTINUED | OUTPATIENT
Start: 2024-05-01 | End: 2024-05-03 | Stop reason: HOSPADM

## 2024-05-01 RX ORDER — MORPHINE SULFATE 4 MG/ML
6 INJECTION, SOLUTION INTRAMUSCULAR; INTRAVENOUS
Status: COMPLETED | OUTPATIENT
Start: 2024-05-01 | End: 2024-05-01

## 2024-05-01 RX ORDER — DEXAMETHASONE SODIUM PHOSPHATE 4 MG/ML
8 INJECTION, SOLUTION INTRA-ARTICULAR; INTRALESIONAL; INTRAMUSCULAR; INTRAVENOUS; SOFT TISSUE
Status: COMPLETED | OUTPATIENT
Start: 2024-05-01 | End: 2024-05-01

## 2024-05-01 RX ORDER — PANTOPRAZOLE SODIUM 40 MG/10ML
40 INJECTION, POWDER, LYOPHILIZED, FOR SOLUTION INTRAVENOUS DAILY
Status: DISCONTINUED | OUTPATIENT
Start: 2024-05-01 | End: 2024-05-03 | Stop reason: HOSPADM

## 2024-05-01 RX ORDER — LEVETIRACETAM 1000 MG/1
1000 TABLET ORAL 2 TIMES DAILY
COMMUNITY

## 2024-05-01 RX ORDER — ONDANSETRON HYDROCHLORIDE 2 MG/ML
4 INJECTION, SOLUTION INTRAVENOUS
Status: COMPLETED | OUTPATIENT
Start: 2024-05-01 | End: 2024-05-01

## 2024-05-01 RX ORDER — TALC
6 POWDER (GRAM) TOPICAL NIGHTLY PRN
Status: DISCONTINUED | OUTPATIENT
Start: 2024-05-01 | End: 2024-05-03 | Stop reason: HOSPADM

## 2024-05-01 RX ORDER — MAGNESIUM SULFATE 1 G/100ML
1 INJECTION INTRAVENOUS ONCE
Status: COMPLETED | OUTPATIENT
Start: 2024-05-01 | End: 2024-05-01

## 2024-05-01 RX ORDER — MORPHINE SULFATE 15 MG/1
15 TABLET, FILM COATED, EXTENDED RELEASE ORAL 2 TIMES DAILY
Status: ON HOLD | COMMUNITY
Start: 2024-04-01 | End: 2024-05-02 | Stop reason: CLARIF

## 2024-05-01 RX ORDER — NICARDIPINE HYDROCHLORIDE 0.2 MG/ML
0-15 INJECTION INTRAVENOUS CONTINUOUS
Status: DISCONTINUED | OUTPATIENT
Start: 2024-05-01 | End: 2024-05-01

## 2024-05-01 RX ORDER — CLOPIDOGREL BISULFATE 75 MG/1
75 TABLET ORAL DAILY
Status: DISCONTINUED | OUTPATIENT
Start: 2024-05-02 | End: 2024-05-03 | Stop reason: HOSPADM

## 2024-05-01 RX ORDER — LISINOPRIL 20 MG/1
20 TABLET ORAL 2 TIMES DAILY
Status: DISCONTINUED | OUTPATIENT
Start: 2024-05-01 | End: 2024-05-03 | Stop reason: HOSPADM

## 2024-05-01 RX ORDER — AMLODIPINE BESYLATE 5 MG/1
10 TABLET ORAL DAILY
Status: DISCONTINUED | OUTPATIENT
Start: 2024-05-02 | End: 2024-05-03 | Stop reason: HOSPADM

## 2024-05-01 RX ORDER — ASPIRIN 81 MG/1
81 TABLET ORAL DAILY
Status: DISCONTINUED | OUTPATIENT
Start: 2024-05-02 | End: 2024-05-03 | Stop reason: HOSPADM

## 2024-05-01 RX ORDER — NALOXONE HCL 0.4 MG/ML
0.02 VIAL (ML) INJECTION
Status: DISCONTINUED | OUTPATIENT
Start: 2024-05-01 | End: 2024-05-03 | Stop reason: HOSPADM

## 2024-05-01 RX ORDER — AMOXICILLIN 250 MG
1 CAPSULE ORAL DAILY PRN
Status: DISCONTINUED | OUTPATIENT
Start: 2024-05-01 | End: 2024-05-03 | Stop reason: HOSPADM

## 2024-05-01 RX ORDER — LEVETIRACETAM 500 MG/1
1000 TABLET ORAL 2 TIMES DAILY
Status: DISCONTINUED | OUTPATIENT
Start: 2024-05-01 | End: 2024-05-03 | Stop reason: HOSPADM

## 2024-05-01 RX ORDER — 1.1% SODIUM FLUORIDE PRESCRIPTION DENTAL CREAM 5 MG/G
CREAM DENTAL
COMMUNITY
Start: 2024-02-29

## 2024-05-01 RX ORDER — POTASSIUM CHLORIDE 7.45 MG/ML
10 INJECTION INTRAVENOUS ONCE
Status: COMPLETED | OUTPATIENT
Start: 2024-05-01 | End: 2024-05-01

## 2024-05-01 RX ORDER — HYDROCODONE BITARTRATE AND ACETAMINOPHEN 5; 325 MG/1; MG/1
1 TABLET ORAL ONCE
Status: COMPLETED | OUTPATIENT
Start: 2024-05-01 | End: 2024-05-01

## 2024-05-01 RX ADMIN — MORPHINE SULFATE 6 MG: 4 INJECTION INTRAVENOUS at 10:05

## 2024-05-01 RX ADMIN — DIPHENHYDRAMINE HYDROCHLORIDE 50 MG: 50 INJECTION, SOLUTION INTRAMUSCULAR; INTRAVENOUS at 12:05

## 2024-05-01 RX ADMIN — NICARDIPINE HYDROCHLORIDE 5 MG/HR: 0.2 INJECTION, SOLUTION INTRAVENOUS at 10:05

## 2024-05-01 RX ADMIN — POTASSIUM BICARBONATE 25 MEQ: 977.5 TABLET, EFFERVESCENT ORAL at 09:05

## 2024-05-01 RX ADMIN — HYDROCODONE BITARTRATE AND ACETAMINOPHEN 1 TABLET: 5; 325 TABLET ORAL at 08:05

## 2024-05-01 RX ADMIN — MAGNESIUM SULFATE 1 G: 1 INJECTION INTRAVENOUS at 10:05

## 2024-05-01 RX ADMIN — IOHEXOL 75 ML: 350 INJECTION, SOLUTION INTRAVENOUS at 01:05

## 2024-05-01 RX ADMIN — LEVETIRACETAM 1000 MG: 500 TABLET, FILM COATED ORAL at 07:05

## 2024-05-01 RX ADMIN — LEVETIRACETAM 1000 MG: 500 TABLET, FILM COATED ORAL at 08:05

## 2024-05-01 RX ADMIN — MORPHINE SULFATE 6 MG: 4 INJECTION INTRAVENOUS at 07:05

## 2024-05-01 RX ADMIN — LISINOPRIL 20 MG: 20 TABLET ORAL at 08:05

## 2024-05-01 RX ADMIN — PANTOPRAZOLE SODIUM 40 MG: 40 INJECTION, POWDER, FOR SOLUTION INTRAVENOUS at 04:05

## 2024-05-01 RX ADMIN — DEXAMETHASONE SODIUM PHOSPHATE 8 MG: 4 INJECTION INTRA-ARTICULAR; INTRALESIONAL; INTRAMUSCULAR; INTRAVENOUS; SOFT TISSUE at 12:05

## 2024-05-01 RX ADMIN — POTASSIUM BICARBONATE 25 MEQ: 977.5 TABLET, EFFERVESCENT ORAL at 05:05

## 2024-05-01 RX ADMIN — ONDANSETRON 4 MG: 2 INJECTION INTRAMUSCULAR; INTRAVENOUS at 07:05

## 2024-05-01 RX ADMIN — POTASSIUM CHLORIDE 10 MEQ: 7.46 INJECTION, SOLUTION INTRAVENOUS at 08:05

## 2024-05-01 NOTE — ASSESSMENT & PLAN NOTE
Patient has hypokalemia which is Acute and currently uncontrolled. Most recent potassium levels reviewed-   Lab Results   Component Value Date    K 2.9 (L) 05/01/2024   . Will continue potassium replacement per protocol and recheck repeat levels after replacement completed.     Potassium of 2.9 on arrival. Mg of 1.1. supplemented in ED. Will repeat

## 2024-05-01 NOTE — ASSESSMENT & PLAN NOTE
Patient with right-sided chest pain.  Troponins negative.  Multiple risk factors for coronary artery disease.  Check stress test in a.m. check 2D echo

## 2024-05-01 NOTE — ED TRIAGE NOTES
"Pt to ED via EMS with complaints of 10/10, R sided chest pain that began this AM. Pt explains pain is constant and radiates to his back. Pt stating pain "feels like a balloon," describes it as pressure. Pt has no cardiac hx. Pt received 3 SL nitro tabs en route, states pain went from 10 to 6. NAD, work up in progress  "

## 2024-05-01 NOTE — CONSULTS
"[facility] - Emergency Dept  Vascular Surgery  Consult Note    Inpatient consult to Vascular Surgery  Consult performed by: Suleman Bowling NP  Consult ordered by: Bon Ogden MD        Subjective:     Chief Complaint/Reason for Admission: Chest pain    History of Present Illness: 73 y.o. M who has a PMHx of GERD, HTN, Seizure (4 weeks ago) and Stroke (4 years ago) who presented to the ED via EMS transportation for emergent evaluation of acute right sided Chest pressure and pain that radiates to the back. Pt describes the CP as a pressure-like sensation and heaviness. Pt states "I think I am having a heart attack." Per chart review Pt was administered 3 sublingual Nitroglycerin tablets. Pt rated the CP a 7/10 during the initial onset. He states that the CP improved after receiving Nitroglycerin and rated the CP a 4/10. Of note pt reports residual right sided weakness, and speech difficulty since the Stroke. Vascular surgery was consulted for further evaluation.    (Not in a hospital admission)      Review of patient's allergies indicates:   Allergen Reactions    Iodine Anaphylaxis    Betadine surgi-prep Blisters    Shellfish containing products Hives       Past Medical History:   Diagnosis Date    GERD (gastroesophageal reflux disease)     Hypertension     Stroke      Past Surgical History:   Procedure Laterality Date    GASTRIC RESTRICTION SURGERY      TELE-STROKE LABS       Family History    None       Tobacco Use    Smoking status: Former    Smokeless tobacco: Former   Substance and Sexual Activity    Alcohol use: No    Drug use: No    Sexual activity: Never     Review of Systems  Constitutional:  Negative for fever.   HENT:  Negative for sore throat.    Respiratory:  Negative for shortness of breath.    Cardiovascular:  Positive for chest pain.   Gastrointestinal:  Positive for abdominal pain. Negative for diarrhea, nausea and vomiting.   Genitourinary:  Negative for dysuria.   Musculoskeletal:  Positive " for myalgias. Negative for back pain.   Skin:  Negative for rash.   Neurological:  Negative for seizures and weakness.   Hematological:  Does not bruise/bleed easily.   All other systems reviewed and are negative.    Objective:     Vital Signs (Most Recent):  Temp: 97.6 °F (36.4 °C) (05/01/24 0639)  Pulse: 76 (05/01/24 1047)  Resp: 15 (05/01/24 1045)  BP: 117/61 (05/01/24 1047)  SpO2: 99 % (05/01/24 1047) Vital Signs (24h Range):  Temp:  [97.6 °F (36.4 °C)] 97.6 °F (36.4 °C)  Pulse:  [] 76  Resp:  [14-20] 15  SpO2:  [98 %-100 %] 99 %  BP: (116-173)/(60-77) 117/61     Weight: 74.8 kg (165 lb)  Body mass index is 26.63 kg/m².        Physical Exam  Constitutional: He appears well-developed and well-nourished.   HENT:   Head: Normocephalic and atraumatic.   Mouth/Throat: Mucous membranes are normal.   Eyes: Conjunctivae and EOM are normal. Pupils are equal, round, and reactive to light. Right conjunctiva is not injected. Left conjunctiva is not injected.   Neck: Neck supple.    Full passive range of motion without pain.     Cardiovascular:  Regular rhythm, S1 normal, S2 normal and normal heart sounds.     Exam reveals no gallop and no friction rub.       No murmur heard.  Pulses:       Radial pulses are 2+ on the right side and 2+ on the left side.   There is right sided chest wall tenderness.   Pulmonary/Chest: Effort normal and breath sounds normal. No respiratory distress.   Abdominal: Abdomen is soft. He exhibits no distension. There is no abdominal tenderness.   Musculoskeletal:      Cervical back: Full passive range of motion without pain and neck supple.      Comments: Good active ROM of all extremities. No lower extremity edema or cyanosis. Right sided residual weakness from previous CVA.  Neurological: He is alert. No cranial nerve deficit or sensory deficit. Gait normal.   A&Ox4, normal speech   Skin: Skin is warm. No ecchymosis and no rash noted.   Psychiatric: He has a normal mood and affect. Thought  content normal.      Significant Labs:  BMP:   Recent Labs   Lab 05/01/24  0721   GLU 90      K 2.9*   *   CO2 18*   BUN 11   CREATININE 0.7   CALCIUM 7.8*   MG 1.1*     CBC:   Recent Labs   Lab 05/01/24  0721   WBC 8.66   RBC 3.32*   HGB 10.5*   HCT 30.7*      MCV 93   MCH 31.6*   MCHC 34.2     CMP:   Recent Labs   Lab 05/01/24  0721   GLU 90   CALCIUM 7.8*   ALBUMIN 2.6*   PROT 4.5*      K 2.9*   CO2 18*   *   BUN 11   CREATININE 0.7   ALKPHOS 39*   ALT 7*   AST 14   BILITOT 0.4       Significant Diagnostics:  I have reviewed all pertinent imaging results/findings within the past 24 hours.  CT CAP WO CON 05/01/24:  Impression:     1. Fusiform dilatation of the ascending thoracic aorta to approximately 43 mm transverse dimension, noting that measurement may be inaccurate due to respiratory motion.  No definite periaortic inflammation or fluid collection identified by noncontrast technique.  No definite crescentic intramural increased attenuation to suggest hematoma.  2. Marked circumferential wall thickening of the esophagus, as may be seen in setting of esophagitis.  Clinical correlation recommended in this regard.  3. Indeterminate renal lesions at the right lower pole and left upper pole, for which further characterization with ultrasound versus dedicated renal mass protocol MRI or CT would be recommended.  4. Additional details of chronic and incidental findings, as provided in the body of report.    CT Head WO Con 5/1/24:    Impression:     No definite acute intracranial findings by noncontrast CT.  Assessment/Plan:     Active Diagnoses:    Diagnosis Date Noted POA    Chest pain [R07.9] 05/01/2024 Unknown    Ascending aortic aneurysm [I71.21] 05/01/2024 Unknown      Problems Resolved During this Admission:   Ascending aortic aneurysm    - All imaging reviewed. Recommend CTA CAP, pre medicate due to contrast allergy. BP control and CTS consult.     Thank you for your consult. I  will follow-up with patient. Please contact us if you have any additional questions.    Suleman Bowling NP  Vascular Surgery  [facility] [place] - Emergency Dept

## 2024-05-01 NOTE — ADMISSIONCARE
AdmissionCare    Guideline: Chest Pain - OBS, Observation    Based on the indications selected for the patient, the bed status of Observation was determined to be MET    The following indications were selected as present at the time of evaluation of the patient:      - Chest pain (or other anginal equivalent) not classified as low risk for acute coronary syndrome, as indicated by 1 or more of the following:    - Patient classified as intermediate risk or high risk for acute coronary syndrome (eg, via use of a clinical decision tool or risk calculator (eg, HEART score greater than 3))    AdmissionCare documentation entered by: Dagoberto Villavicencio    [facility], 27th edition, Copyright © 2023 [facility], [facility] All Rights Reserved.  [id number]:00

## 2024-05-01 NOTE — ASSESSMENT & PLAN NOTE
Presents with CP, CTA with evidence of dilation, though no acute pathology. Troponin negative EKG without ST elevation  Aspirin/statin  Troponin trend  Telemetry  Echo  Cardiology consulted  CT with marked wall thickening of the esophagus, possibly related to GERD/esophagitis   Trial IV protonix and GI consulted

## 2024-05-01 NOTE — SUBJECTIVE & OBJECTIVE
Past Medical History:   Diagnosis Date    GERD (gastroesophageal reflux disease)     Hypertension     Stroke        Past Surgical History:   Procedure Laterality Date    GASTRIC RESTRICTION SURGERY      TELE-STROKE LABS         Review of patient's allergies indicates:   Allergen Reactions    Iodine Anaphylaxis     Pt premedicated prior to contrast with no distress noted.     Betadine surgi-prep Blisters    Shellfish containing products Hives       Current Facility-Administered Medications   Medication Dose Route Frequency Provider Last Rate Last Admin    acetaminophen tablet 650 mg  650 mg Oral Q4H PRN Tito Porter PA-C        glucagon (human recombinant) injection 1 mg  1 mg Intramuscular PRN ShowTito soto PA-C        glucose chewable tablet 16 g  16 g Oral PRN ShowTito soto PA-C        glucose chewable tablet 24 g  24 g Oral PRN ShowTito soto PA-C        magnesium oxide tablet 800 mg  800 mg Oral PRN ShowTito soto PA-C        magnesium oxide tablet 800 mg  800 mg Oral PRN ShowTito soto PA-C        melatonin tablet 6 mg  6 mg Oral Nightly PRN Tito Porter PA-C        naloxone 0.4 mg/mL injection 0.02 mg  0.02 mg Intravenous PRN ShowTito soto PA-C        potassium bicarbonate disintegrating tablet 50 mEq  50 mEq Oral ED 1 Time Bon Ogden MD        senna-docusate 8.6-50 mg per tablet 1 tablet  1 tablet Oral Daily PRN ShowTito soto PA-C        sodium chloride 0.9% flush 10 mL  10 mL Intravenous PRN ShowTito soto PA-C         Current Outpatient Medications   Medication Sig Dispense Refill    escitalopram oxalate (LEXAPRO) 20 MG tablet Take 20 mg by mouth once daily.      amLODIPine (NORVASC) 10 MG tablet Take 10 mg by mouth once daily.      aspirin (ECOTRIN) 81 MG EC tablet Take 81 mg by mouth once daily.      atorvastatin (LIPITOR) 80 MG tablet Take 80 mg by mouth once daily.  2    cloNIDine 0.1 mg/24 hr td ptwk (CATAPRES) 0.1 mg/24 hr 1 patch every 7 days.      clopidogrel  (PLAVIX) 75 mg tablet Take 75 mg by mouth once daily.      HYDROcodone-acetaminophen (NORCO) 5-325 mg per tablet Take 1 tablet by mouth every twelve hours as needed for pain Patient can take 2-3 tablets a day prn pain      levETIRAcetam (KEPPRA) 1000 MG tablet Take 1,000 mg by mouth 2 (two) times daily.      lisinopriL (PRINIVIL,ZESTRIL) 20 MG tablet Take 20 mg by mouth 2 (two) times daily.      morphine (MS CONTIN) 15 MG 12 hr tablet Take 15 mg by mouth 2 (two) times daily.      omeprazole (PRILOSEC OTC) 20 MG tablet Take 20 mg by mouth.      ondansetron (ZOFRAN-ODT) 4 MG TbDL Take 1 tablet (4 mg total) by mouth every 6 (six) hours as needed (nausea). 12 tablet 0    pantoprazole (PROTONIX) 40 MG tablet Take 40 mg by mouth once daily.  3    SF 5000 PLUS 1.1 % Crea Take by mouth.       Family History    None       Tobacco Use    Smoking status: Former    Smokeless tobacco: Former   Substance and Sexual Activity    Alcohol use: No    Drug use: No    Sexual activity: Never     Review of Systems   Constitutional:  Negative for chills and fever.   HENT:  Negative for nosebleeds and tinnitus.    Eyes:  Negative for photophobia and visual disturbance.   Respiratory:  Negative for shortness of breath and wheezing.    Cardiovascular:  Positive for chest pain. Negative for palpitations and leg swelling.   Gastrointestinal:  Negative for abdominal distention, nausea and vomiting.   Genitourinary:  Negative for dysuria, flank pain and hematuria.   Musculoskeletal:  Negative for gait problem and joint swelling.   Skin:  Negative for rash and wound.   Neurological:  Negative for seizures and syncope.     Objective:     Vital Signs (Most Recent):  Temp: 97.6 °F (36.4 °C) (05/01/24 0639)  Pulse: 72 (05/01/24 1452)  Resp: 15 (05/01/24 1452)  BP: 131/65 (05/01/24 1452)  SpO2: 99 % (05/01/24 1452) Vital Signs (24h Range):  Temp:  [97.6 °F (36.4 °C)] 97.6 °F (36.4 °C)  Pulse:  [] 72  Resp:  [12-22] 15  SpO2:  [95 %-100 %] 99  %  BP: (105-173)/(55-77) 131/65     Weight: 74.8 kg (165 lb)  Body mass index is 26.63 kg/m².     Physical Exam  Vitals and nursing note reviewed.   Constitutional:       General: He is not in acute distress.     Appearance: He is well-developed. He is not diaphoretic.   HENT:      Head: Normocephalic and atraumatic.      Right Ear: External ear normal.      Left Ear: External ear normal.   Eyes:      General:         Right eye: No discharge.         Left eye: No discharge.      Conjunctiva/sclera: Conjunctivae normal.   Neck:      Thyroid: No thyromegaly.   Cardiovascular:      Rate and Rhythm: Normal rate and regular rhythm.      Heart sounds: No murmur heard.  Pulmonary:      Effort: Pulmonary effort is normal. No respiratory distress.      Breath sounds: Normal breath sounds.   Abdominal:      General: Bowel sounds are normal. There is no distension.      Palpations: Abdomen is soft. There is no mass.      Tenderness: There is no abdominal tenderness.   Musculoskeletal:         General: No deformity.      Cervical back: Normal range of motion and neck supple.      Right lower leg: No edema.      Left lower leg: No edema.   Skin:     General: Skin is warm and dry.   Neurological:      Mental Status: He is alert and oriented to person, place, and time.      Sensory: No sensory deficit.   Psychiatric:         Mood and Affect: Mood normal.         Behavior: Behavior normal.                Significant Labs: CBC:   Recent Labs   Lab 05/01/24  0721   WBC 8.66   HGB 10.5*   HCT 30.7*        CMP:   Recent Labs   Lab 05/01/24  0721      K 2.9*   *   CO2 18*   GLU 90   BUN 11   CREATININE 0.7   CALCIUM 7.8*   PROT 4.5*   ALBUMIN 2.6*   BILITOT 0.4   ALKPHOS 39*   AST 14   ALT 7*   ANIONGAP 5*     Cardiac Markers:   Recent Labs   Lab 05/01/24  0721   BNP <10     Troponin:   Recent Labs   Lab 05/01/24  0721 05/01/24  1244   TROPONINI <0.006 <0.006       Significant Imaging:   Imaging Results               CTA Chest Abdomen Pelvis (Final result)  Result time 05/01/24 14:28:47      Final result by Vasyl Solis MD (05/01/24 14:28:47)                   Impression:      1. Redemonstrated mild fusiform dilatation of the ascending thoracic aorta.  Current CTA imaging without definite evidence of acute aortic pathology.  No evidence of dissection or other acute aortic syndrome.  No periaortic inflammation or fluid.  2. Allowing for slight differences technique, otherwise there is no significant change relative to earlier same day unenhanced CT of the chest, abdomen, and pelvis.  3. Redemonstrated marked wall thickening of the esophagus.  Current postcontrast imaging demonstrates mucosal enhancement.  Findings raise the possibility esophagitis.  Retained fluid within the esophagus additional may be seen in the setting of dysmotility.  Recommend clinical correlation.  EGD could be considered to assess for the possibility of dysmotility and/or obstructing mass lesion.  4. Redemonstrated sub 6 mm solid nodule right middle lobe of the lung.  5. Redemonstrated bilateral indeterminate renal lesions, for which further characterization with ultrasound versus renal mass protocol MRI or CT would be recommended, if no outside imaging available to confirm stability.  6. Additional details, as provided in the body of the report.      Electronically signed by: Vasyl Solis  Date:    05/01/2024  Time:    14:28               Narrative:    EXAMINATION:  CTA CHEST ABDOMEN PELVIS    CLINICAL HISTORY:  Aortic infection or inflammation;    TECHNIQUE:  CTA phase imaging of the chest, abdomen, and pelvis performed following intravenous administration of 75 mL Omnipaque 350 contrast.    COMPARISON:  Earlier same day unenhanced CT of the chest, abdomen, pelvis.    FINDINGS:  Findings:    VASCULATURE    Thoracic aorta: There is mild fusiform enlargement of the ascending thoracic aorta to approximately 40 mm transverse dimension.  Noting  limitations imposed by motion, no definite bowel irregularity or dissection appreciated.  No definite periaortic inflammation or fluid noted.    Aortic arch and brachiocephalic vessels: No high-grade stenosis, aneurysm, or dissection.  Moderate atherosclerosis.  Conventional 3 vessel arch anatomy.    Abdominal aorta and iliac arteries: No high-grade stenosis or aneurysmal dilatation.  Moderate to heavy atherosclerotic calcification.    Visceral arteries:    The celiac, superior mesenteric, renal, and inferior mesenteric arteries appear patent.  Mild short segment narrowing at the celiac artery origin with questionable poststenotic dilatation and a J-shaped configuration on sagittal reformat.  Findings could relate to atheromatous disease, noting that median arcuate ligament syndrome could appear similar.    Inferior vena cava: Grossly unremarkable by nondedicated technique.    CHEST    Support tubes and lines: None.    Heart: Normal size.    Coronary arteries: Suggested moderate to heavy atherosclerotic calcification.    Pericardium: Normal. No effusion, thickening, or calcification.    Central pulmonary arteries: Normal caliber.    Base of neck/thyroid: Heterogeneous thyroid gland with subcentimeter hypodense nodules not requiring specific imaging surveillance.    Lymph nodes: No supraclavicular, axillary, internal mammary, mediastinal, or hilar adenopathy.    Esophagus: As seen on earlier same day imaging, there is marked wall thickening of the cervical esophagus.  Mucosal enhancement on the current examination raise the possibility of acute inflammation/esophagitis.  Dependent fluid within the esophageal lumen may indicate dysmotility.    Pleura: No effusion, thickening, or calcification.    Body wall: Partially imaged 26 mm subcutaneous lesion in the right paramedian paravertebral region the level of the cervicothoracic junction (axial series 2, image 1).    Airways: Central airways grossly patent.    Lungs:  Assessment limited by nondedicated technique and respiratory motion.  4 mm subpleural nodule right middle lobe is again demonstrated (axial series 2, image 291).  Atelectasis elsewhere.    Bones: No definite acute abnormality.  Degenerative findings in the spine.  Calcification at the dorsal margin the T7-8 intervertebral disc again noted.    ABDOMEN/PELVIS    Liver: Unremarkable.    Gallbladder/bile ducts: Unremarkable. No intra or extrahepatic biliary ductal dilatation.    Pancreas: Unremarkable.    Spleen: Unremarkable.    Adrenals: Unremarkable.    Kidneys: No short-term change indeterminate exophytic hypodense lesion at the lower pole right kidney and upper pole left kidney.  Findings suggesting bilateral angiomyolipomas.    Lymph nodes: No short-term change in appearance of haziness at the mesenteric root with prominent number small lymph nodes, with differential considerations, as discussed previously.    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  No definite evidence of bowel obstruction.  Colonic diverticulosis.    Free fluid or free air: None.    Pelvis: Enlarged prostate gland.    Urinary bladder: Unremarkable.    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  Small fat containing left inguinal hernia.    Bones: No definite acute change.  Degenerative findings in the spine, as before.  Suspected at least moderate central spinal canal stenosis at the L2-3 level.  Nonspecific heterotopic calcification/dystrophic calcification interposed between the proximal right femur and adjoining ischium/inferior pubic ramus.  Partial ankylosis across the sacroiliac joints.                                        CT Chest Abdomen Pelvis Without Contrast (XPD) (Final result)  Result time 05/01/24 09:19:55   Procedure changed from CTA Chest Abdomen Pelvis     Final result by Vasyl Solis MD (05/01/24 09:19:55)                    Impression:      1. Fusiform dilatation of the ascending thoracic aorta to approximately 43 mm transverse dimension, noting that measurement may be inaccurate due to respiratory motion.  No definite periaortic inflammation or fluid collection identified by noncontrast technique.  No definite crescentic intramural increased attenuation to suggest hematoma.  2. Marked circumferential wall thickening of the esophagus, as may be seen in setting of esophagitis.  Clinical correlation recommended in this regard.  3. Indeterminate renal lesions at the right lower pole and left upper pole, for which further characterization with ultrasound versus dedicated renal mass protocol MRI or CT would be recommended.  4. Additional details of chronic and incidental findings, as provided in the body of report.  This report was flagged in Epic as abnormal.      Electronically signed by: Vasyl Solis  Date:    05/01/2024  Time:    09:19               Narrative:    EXAMINATION:  CT CHEST ABDOMEN PELVIS WITHOUT CONTRAST(XPD)    CLINICAL HISTORY:  Aortic infection or inflammation;    TECHNIQUE:  Low dose axial images, sagittal and coronal reformations were obtained from the thoracic inlet to the pubic symphysis scratch    COMPARISON:  None    FINDINGS:  This examination is limited due to lack of intravenous contrast.    CHEST    Support tubes and lines: None.    Aorta: While limited by motion, there is suggested of fusiform dilatation of the ascending thoracic aorta to approximately 43 mm transverse dimension.  Moderate atherosclerosis.  Tortuosity of the descending thoracic aorta.  Conventional 3 vessel arch anatomy.  No significant periaortic inflammation or fluid.    Heart: Normal size.    Coronary arteries: Moderate to heavy atherosclerotic calcifications.    Pericardium: Small volume pericardial fluid, favored physiologic.    Central pulmonary arteries: Normal caliber.    Base of neck/thyroid: Unremarkable.    Lymph nodes:  No supraclavicular, axillary, internal mammary, mediastinal, or hilar adenopathy.    Esophagus: Moderate hiatal hernia.  Marked circumferential wall thickening of the esophagus, as may be seen the setting of esophagitis.    Pleura: No effusion, thickening, or calcification.    Body wall: Unremarkable.    Airways: Central airways grossly patent.    Lungs: Assessment limited by motion.  4 mm solid nodule subpleural right middle lobe (series 4, image 319.    Bones: No definite acute abnormality.  Query osseous demineralization.  Of multilevel degenerative change.  Calcification in the dorsal margin of the T7-8 disc and represent calcified disc osteophyte complex and or dural versus ligamentous calcification.    ABDOMEN/PELVIS    Liver: Normal contour.    Gallbladder and bile ducts: Unremarkable.    Pancreas: Normal contour.    Spleen: Normal contour.    Adrenals: Normal contour.    Kidneys: Mild degree of cortical atrophy of both kidneys.  15 mm hypoattenuating lesion upper pole left kidney.  Hypoattenuating 57 mm hypoechoic lesion at the lower pole right kidney demonstrates attenuation slightly above that of simple fluid.  Additional subcentimeter hypodense lesions elsewhere in the kidneys are too small to definitely characterize.  Question macroscopic fat containing lesion in midpole right kidney in mid to lower pole left kidney which could relate to angiomyolipoma.    Lymph nodes: Haziness at the mesenteric root with mildly prominent number, but small lymph nodes.  Findings are nonspecific but may be seen in setting of mesenteric panniculitis versus reactive etiology to infectious or noninfectious inflammatory gastrointestinal process.    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  No definite evidence of bowel obstruction. Colonic diverticulosis. Normal appendix.    Abdominal aorta: Nonaneurysmal.  Moderate to heavy atherosclerotic calcification.    Inferior vena cava: Unremarkable.    Free  fluid or free air: None.    Pelvis: Penile prosthesis in-situ.  [place] in the left inguinal and anterior lower pelvic region.  Prostate gland enlarged.    Urinary bladder: Unremarkable.    Body wall: As above.  Small fat containing left inguinal hernia.    Bones: No acute abnormality.  Degenerative findings of the spine.  Nonspecific heterotopic ossification/dystrophic calcification apposed between the proximal right femur and ischium/inferior pubic ramus.  Partial ankylosis across the sacroiliac joints.  Degenerative findings of the spine.  Suspected least moderate central spinal canal stenosis at the L2-3 level.                                       CT Head Without Contrast (Final result)  Result time 05/01/24 07:57:02      Final result by Vasyl Solis MD (05/01/24 07:57:02)                   Impression:      No definite acute intracranial findings by noncontrast CT.      Electronically signed by: Vasyl Solis  Date:    05/01/2024  Time:    07:57               Narrative:    EXAMINATION:  CT HEAD WITHOUT CONTRAST    CLINICAL HISTORY:  Seizure, new-onset, no history of trauma;    TECHNIQUE:  Low dose axial images were obtained through the head.  Coronal and sagittal reformations were also performed. Contrast was not administered.    COMPARISON:  None.    FINDINGS:  Blood: No acute intracranial hemorrhage.    Parenchyma: No definite loss of gray-white differentiation to suggest acute or subacute transcortical infarct. Generalized pattern age-related parenchymal volume loss.  Nonspecific areas of white matter hypoattenuation may reflect sequela of chronic small vessel ischemic disease.    Ventricles/Extra-axial spaces: No abnormal extra-axial fluid collection. Basal cisterns are patent.    Vessels: Moderate atherosclerotic calcifications.    Orbits: Status post bilateral lens replacements.    Scalp: Unremarkable.    Skull: There are no depressed skull fractures or destructive bone lesions.    Sinuses and  mastoids: Scattered relatively minor paranasal sinus mucosal thickening.    Other findings: None                                       X-Ray Chest AP Portable (Final result)  Result time 05/01/24 07:28:35      Final result by Vasyl Solis MD (05/01/24 07:28:35)                   Impression:      No convincing evidence of acute pulmonary disease.    Question solid pulmonary nodules in the left lung.  Correlation with any outside imaging to establish stability would be recommended.  If none is available, further characterization with dedicated chest CT would be recommended.      Electronically signed by: Vasyl Solis  Date:    05/01/2024  Time:    07:28               Narrative:    EXAMINATION:  XR CHEST AP PORTABLE    CLINICAL HISTORY:  Chest Pain;    TECHNIQUE:  Single frontal view of the chest was performed.    COMPARISON:  None    FINDINGS:  Monitoring leads overlie the chest.  Cardiomediastinal contours grossly within normal limits.    Question 6 mm solid nodule in the left mid lung zone projecting over the posterior 6th rib.  Questionable additional smaller nodule slightly more inferiorly and laterally.    No definite pneumothorax or large volume pleural effusion.    No acute findings in the visualized abdomen.    Osseous and soft tissue structures appear without definite acute change.

## 2024-05-01 NOTE — HPI
Eric Pink 73 y.o. male with HTN, HLD, GERD presents to the hospital with a chief complaint of chest pain.  He reports he developed sudden-onset chest pain he was sternum described as a building pressure that began at 1:00 a.m..  It resolved with morphine in the ER.  He attempted no treatments at home.  It was associated with shortness of breath and nausea.  He denies fever vomiting abdominal pain leg swelling melena hematuria hematemesis dizziness and syncope.  Reports he has gastric reflux, but the pain felt different.    In the ED, afebrile without leukocytosis, troponin negative, CTA with dilation of the ascending thoracic aorta without evidence of acute aortic pathology, marked wall thickening of the esophagus.

## 2024-05-01 NOTE — ED PROVIDER NOTES
"Encounter Date: 5/1/2024    SCRIBE #1 NOTE: I, Nichol Borges, am scribing for, and in the presence of,  Bon Ogden MD. Other sections scribed: HPI, ROS, PE.       History     Chief Complaint   Patient presents with    Chest Pain     PT woke from sleep with 10/10 right sided chest pain. PT has no cardiac history and pain went from 10 to 6 after 3 SL nitro tabs.      CC: Chest pain    HPI: History is obtained from independent historian. This is a 73 y.o. M who has a PMHx of GERD, HTN, and  Stroke who presents to the ED via EMS transportation for emergent evaluation of acute right sided CP that radiates to the back that began 3 hours PTA. Pt describes the CP as a pressure-like sensation. Pt states "I think I am having a heart attack." Pt was administered 3 sublingual Nitroglycerin tablets and 324 of aspirin. Pt rated the CP a 7/10 during the initial onset. He states that the CP improved after receiving Nitroglycerin and currently rates the CP a 4/10. Pt does not have a Hx of similar problem. Pt is unsure if he has ever had a stress test, or Echocardiogram. Additionally, the pt complains of generalized body aches and unchanged abdominal pain since seizure 4 months ago, and 4 weeks ago. He states that he takes Hydrocodone TID, which he has been taking without relief. Pt states that he has been taking medications for the seizures since seizures 4 months ago. Pt reports a Hx of Stroke 4 years ago. Pt reports residual right sided weakness, and speech difficulty since the Stroke. Pt's surgical history includes Gastric restriction surgery, and "penile implant." Pt's allergies includes Betadine, and Iodine. Pt reports tobacco use. Pt denies rashes, lesions, recent falls, or recent trauma. Pt denies alcohol use, or recreational drug use.    The history is provided by the patient. No  was used.     Review of patient's allergies indicates:   Allergen Reactions    Iodine Anaphylaxis     Pt premedicated " prior to contrast with no distress noted.     Betadine surgi-prep Blisters    Shellfish containing products Hives     Past Medical History:   Diagnosis Date    GERD (gastroesophageal reflux disease)     Hypertension     Stroke      Past Surgical History:   Procedure Laterality Date    GASTRIC RESTRICTION SURGERY      TELE-STROKE LABS       No family history on file.  Social History     Tobacco Use    Smoking status: Former    Smokeless tobacco: Former   Substance Use Topics    Alcohol use: No    Drug use: No     Review of Systems   Constitutional:  Negative for fever.   HENT:  Negative for sore throat.    Respiratory:  Negative for shortness of breath.    Cardiovascular:  Positive for chest pain.   Gastrointestinal:  Positive for abdominal pain. Negative for diarrhea, nausea and vomiting.   Genitourinary:  Negative for dysuria.   Musculoskeletal:  Positive for myalgias. Negative for back pain.   Skin:  Negative for rash.   Neurological:  Negative for seizures and weakness.   Hematological:  Does not bruise/bleed easily.   All other systems reviewed and are negative.      Physical Exam     Initial Vitals [05/01/24 0639]   BP Pulse Resp Temp SpO2   124/70 110 20 97.6 °F (36.4 °C) 98 %      MAP       --         Physical Exam    Nursing note and vitals reviewed.  Constitutional: He appears well-developed and well-nourished.   HENT:   Head: Normocephalic and atraumatic.   Mouth/Throat: Mucous membranes are normal.   Patent   Eyes: Conjunctivae and EOM are normal. Pupils are equal, round, and reactive to light. Right conjunctiva is not injected. Left conjunctiva is not injected.   sclera anicteric   Neck: Neck supple.    Full passive range of motion without pain.     Cardiovascular:  Regular rhythm, S1 normal, S2 normal and normal heart sounds.     Exam reveals no gallop and no friction rub.       No murmur heard.  Pulses:       Radial pulses are 2+ on the right side and 2+ on the left side.   Tachycardia. There is right  sided chest wall tenderness.   Pulmonary/Chest: Effort normal and breath sounds normal. No respiratory distress.   Abdominal: Abdomen is soft. He exhibits no distension. There is no abdominal tenderness.   Musculoskeletal:      Cervical back: Full passive range of motion without pain and neck supple.      Comments: Good active ROM of all extremities. No lower extremity edema or cyanosis.     Neurological: He is alert. No cranial nerve deficit or sensory deficit. Gait normal.   A&Ox4, normal speech   Skin: Skin is warm. No ecchymosis and no rash noted.   Psychiatric: He has a normal mood and affect. Thought content normal.         ED Course   Procedures  Labs Reviewed   CBC W/ AUTO DIFFERENTIAL - Abnormal; Notable for the following components:       Result Value    RBC 3.32 (*)     Hemoglobin 10.5 (*)     Hematocrit 30.7 (*)     MCH 31.6 (*)     All other components within normal limits   COMPREHENSIVE METABOLIC PANEL - Abnormal; Notable for the following components:    Potassium 2.9 (*)     Chloride 120 (*)     CO2 18 (*)     Calcium 7.8 (*)     Total Protein 4.5 (*)     Albumin 2.6 (*)     Alkaline Phosphatase 39 (*)     ALT 7 (*)     Anion Gap 5 (*)     All other components within normal limits   MAGNESIUM - Abnormal; Notable for the following components:    Magnesium 1.1 (*)     All other components within normal limits   TROPONIN I   B-TYPE NATRIURETIC PEPTIDE   D DIMER, QUANTITATIVE   TROPONIN I     EKG Readings: (Independently Interpreted)   EKG done at 6:49 a.m. showing sinus tachycardia rate of 109.  No ST elevation major T-wave abnormality.  Normal axis QRS.       Imaging Results              CTA Chest Abdomen Pelvis (Final result)  Result time 05/01/24 14:28:47      Final result by Vasyl Solis MD (05/01/24 14:28:47)                   Impression:      1. Redemonstrated mild fusiform dilatation of the ascending thoracic aorta.  Current CTA imaging without definite evidence of acute aortic pathology.   No evidence of dissection or other acute aortic syndrome.  No periaortic inflammation or fluid.  2. Allowing for slight differences technique, otherwise there is no significant change relative to earlier same day unenhanced CT of the chest, abdomen, and pelvis.  3. Redemonstrated marked wall thickening of the esophagus.  Current postcontrast imaging demonstrates mucosal enhancement.  Findings raise the possibility esophagitis.  Retained fluid within the esophagus additional may be seen in the setting of dysmotility.  Recommend clinical correlation.  EGD could be considered to assess for the possibility of dysmotility and/or obstructing mass lesion.  4. Redemonstrated sub 6 mm solid nodule right middle lobe of the lung.  5. Redemonstrated bilateral indeterminate renal lesions, for which further characterization with ultrasound versus renal mass protocol MRI or CT would be recommended, if no outside imaging available to confirm stability.  6. Additional details, as provided in the body of the report.      Electronically signed by: Vasyl Solis  Date:    05/01/2024  Time:    14:28               Narrative:    EXAMINATION:  CTA CHEST ABDOMEN PELVIS    CLINICAL HISTORY:  Aortic infection or inflammation;    TECHNIQUE:  CTA phase imaging of the chest, abdomen, and pelvis performed following intravenous administration of 75 mL Omnipaque 350 contrast.    COMPARISON:  Earlier same day unenhanced CT of the chest, abdomen, pelvis.    FINDINGS:  Findings:    VASCULATURE    Thoracic aorta: There is mild fusiform enlargement of the ascending thoracic aorta to approximately 40 mm transverse dimension.  Noting limitations imposed by motion, no definite bowel irregularity or dissection appreciated.  No definite periaortic inflammation or fluid noted.    Aortic arch and brachiocephalic vessels: No high-grade stenosis, aneurysm, or dissection.  Moderate atherosclerosis.  Conventional 3 vessel arch anatomy.    Abdominal aorta and  iliac arteries: No high-grade stenosis or aneurysmal dilatation.  Moderate to heavy atherosclerotic calcification.    Visceral arteries:    The celiac, superior mesenteric, renal, and inferior mesenteric arteries appear patent.  Mild short segment narrowing at the celiac artery origin with questionable poststenotic dilatation and a J-shaped configuration on sagittal reformat.  Findings could relate to atheromatous disease, noting that median arcuate ligament syndrome could appear similar.    Inferior vena cava: Grossly unremarkable by nondedicated technique.    CHEST    Support tubes and lines: None.    Heart: Normal size.    Coronary arteries: Suggested moderate to heavy atherosclerotic calcification.    Pericardium: Normal. No effusion, thickening, or calcification.    Central pulmonary arteries: Normal caliber.    Base of neck/thyroid: Heterogeneous thyroid gland with subcentimeter hypodense nodules not requiring specific imaging surveillance.    Lymph nodes: No supraclavicular, axillary, internal mammary, mediastinal, or hilar adenopathy.    Esophagus: As seen on earlier same day imaging, there is marked wall thickening of the cervical esophagus.  Mucosal enhancement on the current examination raise the possibility of acute inflammation/esophagitis.  Dependent fluid within the esophageal lumen may indicate dysmotility.    Pleura: No effusion, thickening, or calcification.    Body wall: Partially imaged 26 mm subcutaneous lesion in the right paramedian paravertebral region the level of the cervicothoracic junction (axial series 2, image 1).    Airways: Central airways grossly patent.    Lungs: Assessment limited by nondedicated technique and respiratory motion.  4 mm subpleural nodule right middle lobe is again demonstrated (axial series 2, image 291).  Atelectasis elsewhere.    Bones: No definite acute abnormality.  Degenerative findings in the spine.  Calcification at the dorsal margin the T7-8 intervertebral  disc again noted.    ABDOMEN/PELVIS    Liver: Unremarkable.    Gallbladder/bile ducts: Unremarkable. No intra or extrahepatic biliary ductal dilatation.    Pancreas: Unremarkable.    Spleen: Unremarkable.    Adrenals: Unremarkable.    Kidneys: No short-term change indeterminate exophytic hypodense lesion at the lower pole right kidney and upper pole left kidney.  Findings suggesting bilateral angiomyolipomas.    Lymph nodes: No short-term change in appearance of haziness at the mesenteric root with prominent number small lymph nodes, with differential considerations, as discussed previously.    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  No definite evidence of bowel obstruction.  Colonic diverticulosis.    Free fluid or free air: None.    Pelvis: Enlarged prostate gland.    Urinary bladder: Unremarkable.    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  Small fat containing left inguinal hernia.    Bones: No definite acute change.  Degenerative findings in the spine, as before.  Suspected at least moderate central spinal canal stenosis at the L2-3 level.  Nonspecific heterotopic calcification/dystrophic calcification interposed between the proximal right femur and adjoining ischium/inferior pubic ramus.  Partial ankylosis across the sacroiliac joints.                                        CT Chest Abdomen Pelvis Without Contrast (XPD) (Final result)  Result time 05/01/24 09:19:55   Procedure changed from CTA Chest Abdomen Pelvis     Final result by Vasyl Solis MD (05/01/24 09:19:55)                   Impression:      1. Fusiform dilatation of the ascending thoracic aorta to approximately 43 mm transverse dimension, noting that measurement may be inaccurate due to respiratory motion.  No definite periaortic inflammation or fluid collection identified by noncontrast technique.  No definite crescentic  intramural increased attenuation to suggest hematoma.  2. Marked circumferential wall thickening of the esophagus, as may be seen in setting of esophagitis.  Clinical correlation recommended in this regard.  3. Indeterminate renal lesions at the right lower pole and left upper pole, for which further characterization with ultrasound versus dedicated renal mass protocol MRI or CT would be recommended.  4. Additional details of chronic and incidental findings, as provided in the body of report.  This report was flagged in Epic as abnormal.      Electronically signed by: Vasyl Solis  Date:    05/01/2024  Time:    09:19               Narrative:    EXAMINATION:  CT CHEST ABDOMEN PELVIS WITHOUT CONTRAST(XPD)    CLINICAL HISTORY:  Aortic infection or inflammation;    TECHNIQUE:  Low dose axial images, sagittal and coronal reformations were obtained from the thoracic inlet to the pubic symphysis scratch    COMPARISON:  None    FINDINGS:  This examination is limited due to lack of intravenous contrast.    CHEST    Support tubes and lines: None.    Aorta: While limited by motion, there is suggested of fusiform dilatation of the ascending thoracic aorta to approximately 43 mm transverse dimension.  Moderate atherosclerosis.  Tortuosity of the descending thoracic aorta.  Conventional 3 vessel arch anatomy.  No significant periaortic inflammation or fluid.    Heart: Normal size.    Coronary arteries: Moderate to heavy atherosclerotic calcifications.    Pericardium: Small volume pericardial fluid, favored physiologic.    Central pulmonary arteries: Normal caliber.    Base of neck/thyroid: Unremarkable.    Lymph nodes: No supraclavicular, axillary, internal mammary, mediastinal, or hilar adenopathy.    Esophagus: Moderate hiatal hernia.  Marked circumferential wall thickening of the esophagus, as may be seen the setting of esophagitis.    Pleura: No effusion, thickening, or calcification.    Body wall: Unremarkable.    Airways:  Central airways grossly patent.    Lungs: Assessment limited by motion.  4 mm solid nodule subpleural right middle lobe (series 4, image 319.    Bones: No definite acute abnormality.  Query osseous demineralization.  Of multilevel degenerative change.  Calcification in the dorsal margin of the T7-8 disc and represent calcified disc osteophyte complex and or dural versus ligamentous calcification.    ABDOMEN/PELVIS    Liver: Normal contour.    Gallbladder and bile ducts: Unremarkable.    Pancreas: Normal contour.    Spleen: Normal contour.    Adrenals: Normal contour.    Kidneys: Mild degree of cortical atrophy of both kidneys.  15 mm hypoattenuating lesion upper pole left kidney.  Hypoattenuating 57 mm hypoechoic lesion at the lower pole right kidney demonstrates attenuation slightly above that of simple fluid.  Additional subcentimeter hypodense lesions elsewhere in the kidneys are too small to definitely characterize.  Question macroscopic fat containing lesion in midpole right kidney in mid to lower pole left kidney which could relate to angiomyolipoma.    Lymph nodes: Haziness at the mesenteric root with mildly prominent number, but small lymph nodes.  Findings are nonspecific but may be seen in setting of mesenteric panniculitis versus reactive etiology to infectious or noninfectious inflammatory gastrointestinal process.    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  No definite evidence of bowel obstruction. Colonic diverticulosis. Normal appendix.    Abdominal aorta: Nonaneurysmal.  Moderate to heavy atherosclerotic calcification.    Inferior vena cava: Unremarkable.    Free fluid or free air: None.    Pelvis: Penile prosthesis in-situ.  [place] in the left inguinal and anterior lower pelvic region.  Prostate gland enlarged.    Urinary bladder: Unremarkable.    Body wall: As above.  Small fat containing left inguinal hernia.    Bones: No acute abnormality.  Degenerative findings of  the spine.  Nonspecific heterotopic ossification/dystrophic calcification apposed between the proximal right femur and ischium/inferior pubic ramus.  Partial ankylosis across the sacroiliac joints.  Degenerative findings of the spine.  Suspected least moderate central spinal canal stenosis at the L2-3 level.                                       CT Head Without Contrast (Final result)  Result time 05/01/24 07:57:02      Final result by Vasyl Solis MD (05/01/24 07:57:02)                   Impression:      No definite acute intracranial findings by noncontrast CT.      Electronically signed by: Vasyl Solis  Date:    05/01/2024  Time:    07:57               Narrative:    EXAMINATION:  CT HEAD WITHOUT CONTRAST    CLINICAL HISTORY:  Seizure, new-onset, no history of trauma;    TECHNIQUE:  Low dose axial images were obtained through the head.  Coronal and sagittal reformations were also performed. Contrast was not administered.    COMPARISON:  None.    FINDINGS:  Blood: No acute intracranial hemorrhage.    Parenchyma: No definite loss of gray-white differentiation to suggest acute or subacute transcortical infarct. Generalized pattern age-related parenchymal volume loss.  Nonspecific areas of white matter hypoattenuation may reflect sequela of chronic small vessel ischemic disease.    Ventricles/Extra-axial spaces: No abnormal extra-axial fluid collection. Basal cisterns are patent.    Vessels: Moderate atherosclerotic calcifications.    Orbits: Status post bilateral lens replacements.    Scalp: Unremarkable.    Skull: There are no depressed skull fractures or destructive bone lesions.    Sinuses and mastoids: Scattered relatively minor paranasal sinus mucosal thickening.    Other findings: None                                       X-Ray Chest AP Portable (Final result)  Result time 05/01/24 07:28:35      Final result by Vasyl Solis MD (05/01/24 07:28:35)                   Impression:      No  convincing evidence of acute pulmonary disease.    Question solid pulmonary nodules in the left lung.  Correlation with any outside imaging to establish stability would be recommended.  If none is available, further characterization with dedicated chest CT would be recommended.      Electronically signed by: Vasyl Solis  Date:    05/01/2024  Time:    07:28               Narrative:    EXAMINATION:  XR CHEST AP PORTABLE    CLINICAL HISTORY:  Chest Pain;    TECHNIQUE:  Single frontal view of the chest was performed.    COMPARISON:  None    FINDINGS:  Monitoring leads overlie the chest.  Cardiomediastinal contours grossly within normal limits.    Question 6 mm solid nodule in the left mid lung zone projecting over the posterior 6th rib.  Questionable additional smaller nodule slightly more inferiorly and laterally.    No definite pneumothorax or large volume pleural effusion.    No acute findings in the visualized abdomen.    Osseous and soft tissue structures appear without definite acute change.                                       Medications   potassium bicarbonate disintegrating tablet 50 mEq (50 mEq Oral Not Given 5/1/24 0821)   morphine injection 6 mg (6 mg Intravenous Given 5/1/24 0726)   ondansetron injection 4 mg (4 mg Intravenous Given 5/1/24 0723)   levETIRAcetam tablet 1,000 mg (1,000 mg Oral Given 5/1/24 0734)   diphenhydrAMINE injection 50 mg (50 mg Intramuscular Given 5/1/24 1245)   dexAMETHasone injection 8 mg (8 mg Intravenous Given 5/1/24 1245)   potassium chloride 10 mEq in 100 mL IVPB (0 mEq Intravenous Stopped 5/1/24 1002)   magnesium sulfate in dextrose IVPB (premix) 1 g (0 g Intravenous Stopped 5/1/24 1147)   morphine injection 6 mg (6 mg Intravenous Given 5/1/24 1035)   iohexoL (OMNIPAQUE 350) injection 75 mL (75 mLs Intravenous Given 5/1/24 1332)     Medical Decision Making  73-year-old male presenting secondary chest pain radiating to the back.  Concern for dissection.  However patient has  reported anaphylaxis to contrast.  However patient did have contrast a couple weeks ago.  There was discussion with vascular surgery and CT surgery.  Patient also was considering whether not to try to do a RACHEL versus using the contrast and he after multiple discussions tried to go with the contrast.  No response to the contrast with premedication with steroids and Benadryl.  CTA of the chest does not show dissection.  To be stress test tomorrow.  Placed observation further workup management.  Patient was placed on a Cardene drip prophylactically as we are getting the CTA to control blood pressure.  Cardene drip was turned off after CTA negative.  [email or web address]    Also considered but less likely:     Pneumonia: chest xray negative. No fever or leukoscytosis. No cough and lungs non consistent with pna  Tamponade: unlikely due to chest xray and ekg  STEMI: No STEMI on ekg  Esophageal rupture: no dysphagia or vomiting and chest xray negative for mediastinal air  Arrhythmia: no arrhythmia on ekg  CHF: no fluid overload on Cxr and physical exam  Pneumothorax: bilateral breath sounds and no signs of pneumothorax on chest xray     Discussed patient with Hospital Medicine placed observation.  No chest pain on disposition.    Please put in 35 minutes of critical care due to patient having a high risk of cardiac failure.   Separate from teaching and exclusive of procedure and ekg time  Includes:  Time at bedside  Time reviewing test results  Time discussing case with staff  Time documenting the medical record  Time spent with family members  Time spent with consults  Management      Amount and/or Complexity of Data Reviewed  Independent Historian:      Details: See HPI  Labs: ordered.  Radiology: ordered.    Risk  OTC drugs.  Prescription drug management.            Scribe Attestation:   Scribe #1: I performed the above scribed service and the documentation accurately describes the  services I performed. I attest to the accuracy of the note.        ED Course as of 05/01/24 1506   Wed May 01, 2024   0970 Paged and secure chat at vascular surgery regarding findings on CT. [BA]      ED Course User Index  [BA] Bon Ogden MD                       I, bon ogden, personally performed the services described in this documentation. All medical record entries made by the scribe were at my direction and in my presence. I have reviewed the chart and agree that the record reflects my personal performance and is accurate and complete.      Clinical Impression:  Final diagnoses:  [R07.9] Chest pain (Primary)  [E83.42] Hypomagnesemia  [E87.6] Hypokalemia          ED Disposition Condition    Observation Stable                Bon Ogden MD  05/01/24 2838

## 2024-05-01 NOTE — HPI
Patient is a pleasant 73-year-old man.  Came in with right-sided chest pain radiating to the back.  CTA was done after premedication for contrast allergy and did not demonstrate any aortic dissection.  Patient has been doing fine.  Denies chest pains at rest on exertion, orthopnea, PND.  EKG with poor R-wave progression.  No evidence of ST elevation MI. currently chest pain-free.            mpression:     1. Redemonstrated mild fusiform dilatation of the ascending thoracic aorta.  Current CTA imaging without definite evidence of acute aortic pathology.  No evidence of dissection or other acute aortic syndrome.  No periaortic inflammation or fluid.  2. Allowing for slight differences technique, otherwise there is no significant change relative to earlier same day unenhanced CT of the chest, abdomen, and pelvis.  3. Redemonstrated marked wall thickening of the esophagus.  Current postcontrast imaging demonstrates mucosal enhancement.  Findings raise the possibility esophagitis.  Retained fluid within the esophagus additional may be seen in the setting of dysmotility.  Recommend clinical correlation.  EGD could be considered to assess for the possibility of dysmotility and/or obstructing mass lesion.  4. Redemonstrated sub 6 mm solid nodule right middle lobe of the lung.  5. Redemonstrated bilateral indeterminate renal lesions, for which further characterization with ultrasound versus renal mass protocol MRI or CT would be recommended, if no outside imaging available to confirm stability.  6. Additional details, as provided in the body of the report.    Significant Diagnostics:  I have reviewed all pertinent imaging results/findings within the past 24 hours.  CT CAP WO CON 05/01/24:  Impression:     1. Fusiform dilatation of the ascending thoracic aorta to approximately 43 mm transverse dimension, noting that measurement may be inaccurate due to respiratory motion.  No definite periaortic inflammation or fluid  "collection identified by noncontrast technique.  No definite crescentic intramural increased attenuation to suggest hematoma.  2. Marked circumferential wall thickening of the esophagus, as may be seen in setting of esophagitis.  Clinical correlation recommended in this regard.  3. Indeterminate renal lesions at the right lower pole and left upper pole, for which further characterization with ultrasound versus dedicated renal mass protocol MRI or CT would be recommended.  4. Additional details of chronic and incidental findings, as provided in the body of report.       History of Present Illness: 73 y.o. M who has a PMHx of GERD, HTN, Seizure (4 weeks ago) and Stroke (4 years ago) who presented to the ED via EMS transportation for emergent evaluation of acute right sided Chest pressure and pain that radiates to the back. Pt describes the CP as a pressure-like sensation and heaviness. Pt states "I think I am having a heart attack." Per chart review Pt was administered 3 sublingual Nitroglycerin tablets. Pt rated the CP a 7/10 during the initial onset. He states that the CP improved after receiving Nitroglycerin and rated the CP a 4/10. Of note pt reports residual right sided weakness, and speech difficulty since the Stroke. Vascular surgery was consulted for further evaluation.     "

## 2024-05-01 NOTE — H&P
[facility]  History & Physical    Patient Name: Eric Pink  MRN: [id number]  Patient Class: OP- Observation  Admission Date: 5/1/2024  Attending Physician: Rin Valdez MD   Primary Care Provider: Soy Worley MD         Patient information was obtained from patient, past medical records, and ER records.     Subjective:     Principal Problem:Chest pain    Chief Complaint:   Chief Complaint   Patient presents with    Chest Pain     PT woke from sleep with 10/10 right sided chest pain. PT has no cardiac history and pain went from 10 to 6 after 3 SL nitro tabs.         HPI: Eric Pink 73 y.o. male with HTN, HLD, GERD presents to the hospital with a chief complaint of chest pain.  He reports he developed sudden-onset chest pain he was sternum described as a building pressure that began at 1:00 a.m..  It resolved with morphine in the ER.  He attempted no treatments at home.  It was associated with shortness of breath and nausea.  He denies fever vomiting abdominal pain leg swelling melena hematuria hematemesis dizziness and syncope.  Reports he has gastric reflux, but the pain felt different.    In the ED, afebrile without leukocytosis, troponin negative, CTA with dilation of the ascending thoracic aorta without evidence of acute aortic pathology, marked wall thickening of the esophagus.     Past Medical History:   Diagnosis Date    GERD (gastroesophageal reflux disease)     Hypertension     Stroke        Past Surgical History:   Procedure Laterality Date    GASTRIC RESTRICTION SURGERY      TELE-STROKE LABS         Review of patient's allergies indicates:   Allergen Reactions    Iodine Anaphylaxis     Pt premedicated prior to contrast with no distress noted.     Betadine surgi-prep Blisters    Shellfish containing products Hives       Current Facility-Administered Medications   Medication Dose Route Frequency Provider Last Rate Last Admin    acetaminophen tablet  650 mg  650 mg Oral Q4H PRN ShowTito soto PA-C        glucagon (human recombinant) injection 1 mg  1 mg Intramuscular PRN ShowTito soto PA-C        glucose chewable tablet 16 g  16 g Oral PRN ShowTito soto PA-C        glucose chewable tablet 24 g  24 g Oral PRN ShowTito soto PA-C        magnesium oxide tablet 800 mg  800 mg Oral PRN ShowTito soto PA-C        magnesium oxide tablet 800 mg  800 mg Oral PRN ShowTito soto PA-C        melatonin tablet 6 mg  6 mg Oral Nightly PRN ShowTito soto PA-C        naloxone 0.4 mg/mL injection 0.02 mg  0.02 mg Intravenous PRN ShowTito soto PA-C        potassium bicarbonate disintegrating tablet 50 mEq  50 mEq Oral ED 1 Time Bon Ogden MD        senna-docusate 8.6-50 mg per tablet 1 tablet  1 tablet Oral Daily PRN ShowTito soto PA-C        sodium chloride 0.9% flush 10 mL  10 mL Intravenous PRN ShowTito soto PA-C         Current Outpatient Medications   Medication Sig Dispense Refill    escitalopram oxalate (LEXAPRO) 20 MG tablet Take 20 mg by mouth once daily.      amLODIPine (NORVASC) 10 MG tablet Take 10 mg by mouth once daily.      aspirin (ECOTRIN) 81 MG EC tablet Take 81 mg by mouth once daily.      atorvastatin (LIPITOR) 80 MG tablet Take 80 mg by mouth once daily.  2    cloNIDine 0.1 mg/24 hr td ptwk (CATAPRES) 0.1 mg/24 hr 1 patch every 7 days.      clopidogrel (PLAVIX) 75 mg tablet Take 75 mg by mouth once daily.      HYDROcodone-acetaminophen (NORCO) 5-325 mg per tablet Take 1 tablet by mouth every twelve hours as needed for pain Patient can take 2-3 tablets a day prn pain      levETIRAcetam (KEPPRA) 1000 MG tablet Take 1,000 mg by mouth 2 (two) times daily.      lisinopriL (PRINIVIL,ZESTRIL) 20 MG tablet Take 20 mg by mouth 2 (two) times daily.      morphine (MS CONTIN) 15 MG 12 hr tablet Take 15 mg by mouth 2 (two) times daily.      omeprazole (PRILOSEC OTC) 20 MG tablet Take 20 mg by mouth.      ondansetron (ZOFRAN-ODT) 4 MG TbDL  Take 1 tablet (4 mg total) by mouth every 6 (six) hours as needed (nausea). 12 tablet 0    pantoprazole (PROTONIX) 40 MG tablet Take 40 mg by mouth once daily.  3    SF 5000 PLUS 1.1 % Crea Take by mouth.       Family History    None       Tobacco Use    Smoking status: Former    Smokeless tobacco: Former   Substance and Sexual Activity    Alcohol use: No    Drug use: No    Sexual activity: Never     Review of Systems   Constitutional:  Negative for chills and fever.   HENT:  Negative for nosebleeds and tinnitus.    Eyes:  Negative for photophobia and visual disturbance.   Respiratory:  Negative for shortness of breath and wheezing.    Cardiovascular:  Positive for chest pain. Negative for palpitations and leg swelling.   Gastrointestinal:  Negative for abdominal distention, nausea and vomiting.   Genitourinary:  Negative for dysuria, flank pain and hematuria.   Musculoskeletal:  Negative for gait problem and joint swelling.   Skin:  Negative for rash and wound.   Neurological:  Negative for seizures and syncope.     Objective:     Vital Signs (Most Recent):  Temp: 97.6 °F (36.4 °C) (05/01/24 0639)  Pulse: 72 (05/01/24 1452)  Resp: 15 (05/01/24 1452)  BP: 131/65 (05/01/24 1452)  SpO2: 99 % (05/01/24 1452) Vital Signs (24h Range):  Temp:  [97.6 °F (36.4 °C)] 97.6 °F (36.4 °C)  Pulse:  [] 72  Resp:  [12-22] 15  SpO2:  [95 %-100 %] 99 %  BP: (105-173)/(55-77) 131/65     Weight: 74.8 kg (165 lb)  Body mass index is 26.63 kg/m².     Physical Exam  Vitals and nursing note reviewed.   Constitutional:       General: He is not in acute distress.     Appearance: He is well-developed. He is not diaphoretic.   HENT:      Head: Normocephalic and atraumatic.      Right Ear: External ear normal.      Left Ear: External ear normal.   Eyes:      General:         Right eye: No discharge.         Left eye: No discharge.      Conjunctiva/sclera: Conjunctivae normal.   Neck:      Thyroid: No thyromegaly.   Cardiovascular:       Rate and Rhythm: Normal rate and regular rhythm.      Heart sounds: No murmur heard.  Pulmonary:      Effort: Pulmonary effort is normal. No respiratory distress.      Breath sounds: Normal breath sounds.   Abdominal:      General: Bowel sounds are normal. There is no distension.      Palpations: Abdomen is soft. There is no mass.      Tenderness: There is no abdominal tenderness.   Musculoskeletal:         General: No deformity.      Cervical back: Normal range of motion and neck supple.      Right lower leg: No edema.      Left lower leg: No edema.   Skin:     General: Skin is warm and dry.   Neurological:      Mental Status: He is alert and oriented to person, place, and time.      Sensory: No sensory deficit.   Psychiatric:         Mood and Affect: Mood normal.         Behavior: Behavior normal.                Significant Labs: CBC:   Recent Labs   Lab 05/01/24 0721   WBC 8.66   HGB 10.5*   HCT 30.7*        CMP:   Recent Labs   Lab 05/01/24 0721      K 2.9*   *   CO2 18*   GLU 90   BUN 11   CREATININE 0.7   CALCIUM 7.8*   PROT 4.5*   ALBUMIN 2.6*   BILITOT 0.4   ALKPHOS 39*   AST 14   ALT 7*   ANIONGAP 5*     Cardiac Markers:   Recent Labs   Lab 05/01/24  0721   BNP <10     Troponin:   Recent Labs   Lab 05/01/24  0721 05/01/24  1244   TROPONINI <0.006 <0.006       Significant Imaging:   Imaging Results              CTA Chest Abdomen Pelvis (Final result)  Result time 05/01/24 14:28:47      Final result by Vasyl Solis MD (05/01/24 14:28:47)                   Impression:      1. Redemonstrated mild fusiform dilatation of the ascending thoracic aorta.  Current CTA imaging without definite evidence of acute aortic pathology.  No evidence of dissection or other acute aortic syndrome.  No periaortic inflammation or fluid.  2. Allowing for slight differences technique, otherwise there is no significant change relative to earlier same day unenhanced CT of the chest, abdomen, and pelvis.  3.  Redemonstrated marked wall thickening of the esophagus.  Current postcontrast imaging demonstrates mucosal enhancement.  Findings raise the possibility esophagitis.  Retained fluid within the esophagus additional may be seen in the setting of dysmotility.  Recommend clinical correlation.  EGD could be considered to assess for the possibility of dysmotility and/or obstructing mass lesion.  4. Redemonstrated sub 6 mm solid nodule right middle lobe of the lung.  5. Redemonstrated bilateral indeterminate renal lesions, for which further characterization with ultrasound versus renal mass protocol MRI or CT would be recommended, if no outside imaging available to confirm stability.  6. Additional details, as provided in the body of the report.      Electronically signed by: Vasyl Solis  Date:    05/01/2024  Time:    14:28               Narrative:    EXAMINATION:  CTA CHEST ABDOMEN PELVIS    CLINICAL HISTORY:  Aortic infection or inflammation;    TECHNIQUE:  CTA phase imaging of the chest, abdomen, and pelvis performed following intravenous administration of 75 mL Omnipaque 350 contrast.    COMPARISON:  Earlier same day unenhanced CT of the chest, abdomen, pelvis.    FINDINGS:  Findings:    VASCULATURE    Thoracic aorta: There is mild fusiform enlargement of the ascending thoracic aorta to approximately 40 mm transverse dimension.  Noting limitations imposed by motion, no definite bowel irregularity or dissection appreciated.  No definite periaortic inflammation or fluid noted.    Aortic arch and brachiocephalic vessels: No high-grade stenosis, aneurysm, or dissection.  Moderate atherosclerosis.  Conventional 3 vessel arch anatomy.    Abdominal aorta and iliac arteries: No high-grade stenosis or aneurysmal dilatation.  Moderate to heavy atherosclerotic calcification.    Visceral arteries:    The celiac, superior mesenteric, renal, and inferior mesenteric arteries appear patent.  Mild short segment narrowing at the  celiac artery origin with questionable poststenotic dilatation and a J-shaped configuration on sagittal reformat.  Findings could relate to atheromatous disease, noting that median arcuate ligament syndrome could appear similar.    Inferior vena cava: Grossly unremarkable by nondedicated technique.    CHEST    Support tubes and lines: None.    Heart: Normal size.    Coronary arteries: Suggested moderate to heavy atherosclerotic calcification.    Pericardium: Normal. No effusion, thickening, or calcification.    Central pulmonary arteries: Normal caliber.    Base of neck/thyroid: Heterogeneous thyroid gland with subcentimeter hypodense nodules not requiring specific imaging surveillance.    Lymph nodes: No supraclavicular, axillary, internal mammary, mediastinal, or hilar adenopathy.    Esophagus: As seen on earlier same day imaging, there is marked wall thickening of the cervical esophagus.  Mucosal enhancement on the current examination raise the possibility of acute inflammation/esophagitis.  Dependent fluid within the esophageal lumen may indicate dysmotility.    Pleura: No effusion, thickening, or calcification.    Body wall: Partially imaged 26 mm subcutaneous lesion in the right paramedian paravertebral region the level of the cervicothoracic junction (axial series 2, image 1).    Airways: Central airways grossly patent.    Lungs: Assessment limited by nondedicated technique and respiratory motion.  4 mm subpleural nodule right middle lobe is again demonstrated (axial series 2, image 291).  Atelectasis elsewhere.    Bones: No definite acute abnormality.  Degenerative findings in the spine.  Calcification at the dorsal margin the T7-8 intervertebral disc again noted.    ABDOMEN/PELVIS    Liver: Unremarkable.    Gallbladder/bile ducts: Unremarkable. No intra or extrahepatic biliary ductal dilatation.    Pancreas: Unremarkable.    Spleen: Unremarkable.    Adrenals: Unremarkable.    Kidneys: No short-term change  indeterminate exophytic hypodense lesion at the lower pole right kidney and upper pole left kidney.  Findings suggesting bilateral angiomyolipomas.    Lymph nodes: No short-term change in appearance of haziness at the mesenteric root with prominent number small lymph nodes, with differential considerations, as discussed previously.    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  No definite evidence of bowel obstruction.  Colonic diverticulosis.    Free fluid or free air: None.    Pelvis: Enlarged prostate gland.    Urinary bladder: Unremarkable.    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  Small fat containing left inguinal hernia.    Bones: No definite acute change.  Degenerative findings in the spine, as before.  Suspected at least moderate central spinal canal stenosis at the L2-3 level.  Nonspecific heterotopic calcification/dystrophic calcification interposed between the proximal right femur and adjoining ischium/inferior pubic ramus.  Partial ankylosis across the sacroiliac joints.                                        CT Chest Abdomen Pelvis Without Contrast (XPD) (Final result)  Result time 05/01/24 09:19:55   Procedure changed from CTA Chest Abdomen Pelvis     Final result by Vasyl Solis MD (05/01/24 09:19:55)                   Impression:      1. Fusiform dilatation of the ascending thoracic aorta to approximately 43 mm transverse dimension, noting that measurement may be inaccurate due to respiratory motion.  No definite periaortic inflammation or fluid collection identified by noncontrast technique.  No definite crescentic intramural increased attenuation to suggest hematoma.  2. Marked circumferential wall thickening of the esophagus, as may be seen in setting of esophagitis.  Clinical correlation recommended in this regard.  3. Indeterminate renal lesions at the right lower pole and left  upper pole, for which further characterization with ultrasound versus dedicated renal mass protocol MRI or CT would be recommended.  4. Additional details of chronic and incidental findings, as provided in the body of report.  This report was flagged in Epic as abnormal.      Electronically signed by: Vasyl Solis  Date:    05/01/2024  Time:    09:19               Narrative:    EXAMINATION:  CT CHEST ABDOMEN PELVIS WITHOUT CONTRAST(XPD)    CLINICAL HISTORY:  Aortic infection or inflammation;    TECHNIQUE:  Low dose axial images, sagittal and coronal reformations were obtained from the thoracic inlet to the pubic symphysis scratch    COMPARISON:  None    FINDINGS:  This examination is limited due to lack of intravenous contrast.    CHEST    Support tubes and lines: None.    Aorta: While limited by motion, there is suggested of fusiform dilatation of the ascending thoracic aorta to approximately 43 mm transverse dimension.  Moderate atherosclerosis.  Tortuosity of the descending thoracic aorta.  Conventional 3 vessel arch anatomy.  No significant periaortic inflammation or fluid.    Heart: Normal size.    Coronary arteries: Moderate to heavy atherosclerotic calcifications.    Pericardium: Small volume pericardial fluid, favored physiologic.    Central pulmonary arteries: Normal caliber.    Base of neck/thyroid: Unremarkable.    Lymph nodes: No supraclavicular, axillary, internal mammary, mediastinal, or hilar adenopathy.    Esophagus: Moderate hiatal hernia.  Marked circumferential wall thickening of the esophagus, as may be seen the setting of esophagitis.    Pleura: No effusion, thickening, or calcification.    Body wall: Unremarkable.    Airways: Central airways grossly patent.    Lungs: Assessment limited by motion.  4 mm solid nodule subpleural right middle lobe (series 4, image 319.    Bones: No definite acute abnormality.  Query osseous demineralization.  Of multilevel degenerative change.  Calcification in  the dorsal margin of the T7-8 disc and represent calcified disc osteophyte complex and or dural versus ligamentous calcification.    ABDOMEN/PELVIS    Liver: Normal contour.    Gallbladder and bile ducts: Unremarkable.    Pancreas: Normal contour.    Spleen: Normal contour.    Adrenals: Normal contour.    Kidneys: Mild degree of cortical atrophy of both kidneys.  15 mm hypoattenuating lesion upper pole left kidney.  Hypoattenuating 57 mm hypoechoic lesion at the lower pole right kidney demonstrates attenuation slightly above that of simple fluid.  Additional subcentimeter hypodense lesions elsewhere in the kidneys are too small to definitely characterize.  Question macroscopic fat containing lesion in midpole right kidney in mid to lower pole left kidney which could relate to angiomyolipoma.    Lymph nodes: Haziness at the mesenteric root with mildly prominent number, but small lymph nodes.  Findings are nonspecific but may be seen in setting of mesenteric panniculitis versus reactive etiology to infectious or noninfectious inflammatory gastrointestinal process.    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  No definite evidence of bowel obstruction. Colonic diverticulosis. Normal appendix.    Abdominal aorta: Nonaneurysmal.  Moderate to heavy atherosclerotic calcification.    Inferior vena cava: Unremarkable.    Free fluid or free air: None.    Pelvis: Penile prosthesis in-situ.  [place] in the left inguinal and anterior lower pelvic region.  Prostate gland enlarged.    Urinary bladder: Unremarkable.    Body wall: As above.  Small fat containing left inguinal hernia.    Bones: No acute abnormality.  Degenerative findings of the spine.  Nonspecific heterotopic ossification/dystrophic calcification apposed between the proximal right femur and ischium/inferior pubic ramus.  Partial ankylosis across the sacroiliac joints.  Degenerative findings of the spine.  Suspected least moderate central  spinal canal stenosis at the L2-3 level.                                       CT Head Without Contrast (Final result)  Result time 05/01/24 07:57:02      Final result by Vasyl Solis MD (05/01/24 07:57:02)                   Impression:      No definite acute intracranial findings by noncontrast CT.      Electronically signed by: Vasyl Solis  Date:    05/01/2024  Time:    07:57               Narrative:    EXAMINATION:  CT HEAD WITHOUT CONTRAST    CLINICAL HISTORY:  Seizure, new-onset, no history of trauma;    TECHNIQUE:  Low dose axial images were obtained through the head.  Coronal and sagittal reformations were also performed. Contrast was not administered.    COMPARISON:  None.    FINDINGS:  Blood: No acute intracranial hemorrhage.    Parenchyma: No definite loss of gray-white differentiation to suggest acute or subacute transcortical infarct. Generalized pattern age-related parenchymal volume loss.  Nonspecific areas of white matter hypoattenuation may reflect sequela of chronic small vessel ischemic disease.    Ventricles/Extra-axial spaces: No abnormal extra-axial fluid collection. Basal cisterns are patent.    Vessels: Moderate atherosclerotic calcifications.    Orbits: Status post bilateral lens replacements.    Scalp: Unremarkable.    Skull: There are no depressed skull fractures or destructive bone lesions.    Sinuses and mastoids: Scattered relatively minor paranasal sinus mucosal thickening.    Other findings: None                                       X-Ray Chest AP Portable (Final result)  Result time 05/01/24 07:28:35      Final result by Vasyl Solis MD (05/01/24 07:28:35)                   Impression:      No convincing evidence of acute pulmonary disease.    Question solid pulmonary nodules in the left lung.  Correlation with any outside imaging to establish stability would be recommended.  If none is available, further characterization with dedicated chest CT would be  recommended.      Electronically signed by: Vasyl Solis  Date:    05/01/2024  Time:    07:28               Narrative:    EXAMINATION:  XR CHEST AP PORTABLE    CLINICAL HISTORY:  Chest Pain;    TECHNIQUE:  Single frontal view of the chest was performed.    COMPARISON:  None    FINDINGS:  Monitoring leads overlie the chest.  Cardiomediastinal contours grossly within normal limits.    Question 6 mm solid nodule in the left mid lung zone projecting over the posterior 6th rib.  Questionable additional smaller nodule slightly more inferiorly and laterally.    No definite pneumothorax or large volume pleural effusion.    No acute findings in the visualized abdomen.    Osseous and soft tissue structures appear without definite acute change.                                      Assessment/Plan:     * Chest pain  Presents with CP, CTA with evidence of dilation, though no acute pathology. Troponin negative EKG without ST elevation  Aspirin/statin  Troponin trend  Telemetry  Echo  Cardiology consulted  CT with marked wall thickening of the esophagus, possibly related to GERD/esophagitis   Trial IV protonix and GI consulted    History of CVA (cerebrovascular accident)  Reports CVA 4 years ago that effected right side with recovery. Developed seizures 4 months ago, but well controlled no further seizures on keppra  Continue aspirin/statin/plavix  Continue keppra    Hypokalemia  Patient has hypokalemia which is Acute and currently uncontrolled. Most recent potassium levels reviewed-   Lab Results   Component Value Date    K 2.9 (L) 05/01/2024   . Will continue potassium replacement per protocol and recheck repeat levels after replacement completed.     Potassium of 2.9 on arrival. Mg of 1.1. supplemented in ED. Will repeat     HLD (hyperlipidemia)  Continue home statin    HTN (hypertension)  Chronic, controlled. Latest blood pressure and vitals reviewed-     Temp:  [97.6 °F (36.4 °C)]   Pulse:  []   Resp:  [12-22]   BP:  (105-173)/(55-77)   SpO2:  [95 %-100 %] .   Home meds for hypertension were reviewed and noted below.   Hypertension Medications               amLODIPine (NORVASC) 10 MG tablet Take 10 mg by mouth once daily.    cloNIDine 0.1 mg/24 hr td ptwk (CATAPRES) 0.1 mg/24 hr 1 patch every 7 days.    lisinopriL (PRINIVIL,ZESTRIL) 20 MG tablet Take 20 mg by mouth 2 (two) times daily.            While in the hospital, will manage blood pressure as follows; Continue home antihypertensive regimen    Will utilize p.r.n. blood pressure medication only if patient's blood pressure greater than 180/110 and he develops symptoms such as worsening chest pain or shortness of breath.    Ascending aortic aneurysm  Underwent CTA with fusiform dilatation of the ascending thoracic aorta.  Current CTA imaging without definite evidence of acute aortic pathology.  No evidence of dissection or other acute aortic syndrome.  No periaortic inflammation or fluid.   Discussed with CT surgery by ED recommending outpatient follow up      VTE Risk Mitigation (From admission, onward)           Ordered     Place SIN hose  Until discontinued         05/01/24 1515     IP VTE HIGH RISK PATIENT  Once         05/01/24 1515     Place sequential compression device  Until discontinued         05/01/24 1515                     Discussed with ED physician.    VTE: sin/scd  Code: Full  Diet: cardiac  Dispo: pending echo  On 05/01/2024, patient should be placed in hospital observation services under my care in collaboration with Rin Valdez MD.      AdmissionCare    Guideline: Chest Pain - OBS, Observation    Based on the indications selected for the patient, the bed status of Observation was determined to be MET    The following indications were selected as present at the time of evaluation of the patient:      - Chest pain (or other anginal equivalent) not classified as low risk for acute coronary syndrome, as indicated by 1 or more of the following:    -  Patient classified as intermediate risk or high risk for acute coronary syndrome (eg, via use of a clinical decision tool or risk calculator (eg, HEART score greater than 3))    AdmissionCare documentation entered by: Dagoberto Villavicencio    [facility] [employer], 27th edition, Copyright © 2023 [facility] [employer], [facility] All Rights Reserved.  [id number]:25-05:00    Tito Porter PA-C  Department of Hospital Medicine  [facility] - Emergency Dept

## 2024-05-01 NOTE — ASSESSMENT & PLAN NOTE
Underwent CTA with fusiform dilatation of the ascending thoracic aorta.  Current CTA imaging without definite evidence of acute aortic pathology.  No evidence of dissection or other acute aortic syndrome.  No periaortic inflammation or fluid.   Discussed with CT surgery by ED recommending outpatient follow up

## 2024-05-01 NOTE — ASSESSMENT & PLAN NOTE
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 Developed seizures 4 months ago, but well controlled no further seizures on keppra  Continue aspirin/statin/plavix  Continue keppra

## 2024-05-01 NOTE — SUBJECTIVE & OBJECTIVE
Past Medical History:   Diagnosis Date    GERD (gastroesophageal reflux disease)     Hypertension     Stroke        Past Surgical History:   Procedure Laterality Date    GASTRIC RESTRICTION SURGERY      TELE-STROKE LABS         Review of patient's allergies indicates:   Allergen Reactions    Iodine Anaphylaxis     Pt premedicated prior to contrast with no distress noted.     Betadine surgi-prep Blisters    Shellfish containing products Hives       Current Facility-Administered Medications   Medication Dose Route Frequency Provider Last Rate Last Admin    acetaminophen tablet 650 mg  650 mg Oral Q4H PRN Tito Porter PA-C        glucagon (human recombinant) injection 1 mg  1 mg Intramuscular PRN ShowTito soto PA-C        glucose chewable tablet 16 g  16 g Oral PRN ShowTito soto PA-C        glucose chewable tablet 24 g  24 g Oral PRN ShowTito soto PA-C        magnesium oxide tablet 800 mg  800 mg Oral PRN ShowTito soto PA-C        magnesium oxide tablet 800 mg  800 mg Oral PRN ShowTito soto PA-C        melatonin tablet 6 mg  6 mg Oral Nightly PRN Tito Porter PA-C        naloxone 0.4 mg/mL injection 0.02 mg  0.02 mg Intravenous PRN ShowTito soto PA-C        potassium bicarbonate disintegrating tablet 50 mEq  50 mEq Oral ED 1 Time Bon Ogden MD        senna-docusate 8.6-50 mg per tablet 1 tablet  1 tablet Oral Daily PRN ShowTito soto PA-C        sodium chloride 0.9% flush 10 mL  10 mL Intravenous PRN ShowTito soto PA-C         Current Outpatient Medications   Medication Sig Dispense Refill    escitalopram oxalate (LEXAPRO) 20 MG tablet Take 20 mg by mouth once daily.      amLODIPine (NORVASC) 10 MG tablet Take 10 mg by mouth once daily.      aspirin (ECOTRIN) 81 MG EC tablet Take 81 mg by mouth once daily.      atorvastatin (LIPITOR) 80 MG tablet Take 80 mg by mouth once daily.  2    cloNIDine 0.1 mg/24 hr td ptwk (CATAPRES) 0.1 mg/24 hr 1 patch every 7 days.      clopidogrel  (PLAVIX) 75 mg tablet Take 75 mg by mouth once daily.      HYDROcodone-acetaminophen (NORCO) 5-325 mg per tablet Take 1 tablet by mouth every twelve hours as needed for pain Patient can take 2-3 tablets a day prn pain      levETIRAcetam (KEPPRA) 1000 MG tablet Take 1,000 mg by mouth 2 (two) times daily.      lisinopriL (PRINIVIL,ZESTRIL) 20 MG tablet Take 20 mg by mouth 2 (two) times daily.      morphine (MS CONTIN) 15 MG 12 hr tablet Take 15 mg by mouth 2 (two) times daily.      omeprazole (PRILOSEC OTC) 20 MG tablet Take 20 mg by mouth.      ondansetron (ZOFRAN-ODT) 4 MG TbDL Take 1 tablet (4 mg total) by mouth every 6 (six) hours as needed (nausea). 12 tablet 0    pantoprazole (PROTONIX) 40 MG tablet Take 40 mg by mouth once daily.  3    SF 5000 PLUS 1.1 % Crea Take by mouth.       Family History    None       Tobacco Use    Smoking status: Former    Smokeless tobacco: Former   Substance and Sexual Activity    Alcohol use: No    Drug use: No    Sexual activity: Never     Review of Systems   Constitutional: Negative.   HENT: Negative.     Eyes: Negative.    Cardiovascular:  Positive for chest pain.   Respiratory: Negative.     Endocrine: Negative.    Hematologic/Lymphatic: Negative.    Skin: Negative.    Musculoskeletal: Negative.    Gastrointestinal: Negative.    Genitourinary: Negative.    Neurological: Negative.    Psychiatric/Behavioral: Negative.     Allergic/Immunologic: Negative.      Objective:     Vital Signs (Most Recent):  Temp: 97.6 °F (36.4 °C) (05/01/24 0639)  Pulse: 72 (05/01/24 1452)  Resp: 15 (05/01/24 1452)  BP: 131/65 (05/01/24 1452)  SpO2: 99 % (05/01/24 1452) Vital Signs (24h Range):  Temp:  [97.6 °F (36.4 °C)] 97.6 °F (36.4 °C)  Pulse:  [] 72  Resp:  [12-22] 15  SpO2:  [95 %-100 %] 99 %  BP: (105-173)/(55-77) 131/65     Weight: 74.8 kg (165 lb)  Body mass index is 26.63 kg/m².    SpO2: 99 %       No intake or output data in the 24 hours ending 05/01/24 1617    Lines/Drains/Airways        Peripheral Intravenous Line  Duration                  Peripheral IV - Single Lumen 05/01/24 1005 20 G Distal;Posterior;Right Forearm <1 day         Peripheral IV - Single Lumen 05/01/24 20 G Left Antecubital <1 day                     Physical Exam  Vitals reviewed.   Constitutional:       Appearance: He is well-developed.   HENT:      Head: Normocephalic.   Eyes:      Conjunctiva/sclera: Conjunctivae normal.      Pupils: Pupils are equal, round, and reactive to light.   Cardiovascular:      Rate and Rhythm: Normal rate and regular rhythm.      Heart sounds: Normal heart sounds.   Pulmonary:      Effort: Pulmonary effort is normal.      Breath sounds: Normal breath sounds.   Abdominal:      General: Bowel sounds are normal.      Palpations: Abdomen is soft.   Musculoskeletal:      Cervical back: Normal range of motion and neck supple.   Skin:     General: Skin is warm.   Neurological:      Mental Status: He is alert and oriented to person, place, and time.          Significant Labs:     DATA:     Laboratory:  CBC:  Recent Labs   Lab 05/01/24  0721   WBC 8.66   Hemoglobin 10.5 L   Hematocrit 30.7 L   Platelets 166       CHEMISTRIES:  Recent Labs   Lab 01/15/22  0432 06/07/23  1430 05/01/24  0721   Glucose  --   --  90   Sodium 136 139 143   Potassium 3.7 3.9 2.9 L   BUN 14.9 17.1 11   Creatinine 0.79 1.03 0.7   eGFR  103 76 L  --    Calcium 8.7 9.9 7.8 L   Magnesium  --   --  1.1 L       CARDIAC BIOMARKERS:  Recent Labs   Lab 01/15/22  0432 04/25/24  1641 05/01/24  0721 05/01/24  1244    84  --   --    Troponin I  --   --  <0.006 <0.006       COAGS:  Recent Labs   Lab 03/19/24  1216 03/20/24  0517 03/23/24  0446   INR 1.0 1.0 1.0       LIPIDS/LFTS:  Recent Labs   Lab 01/15/22  0432 06/07/23  1430 05/01/24  0721   AST 16 17 14   ALT 17 24 7 L       Hemoglobin A1C   Date Value Ref Range Status   01/15/2022 5.3 <5.7 % Final       TSH  Recent Labs   Lab 03/21/24  0327   TSH 2.78       The ASCVD  Risk score (Leslie LAZARO, et al., 2019) failed to calculate for the following reasons:    The patient has a prior MI or stroke diagnosis       BNP    Lab Results   Component Value Date/Time    BNP <10 05/01/2024 07:21 AM            ECHO    No results found for this or any previous visit.      STRESS TEST    No results found for this or any previous visit.        CATH    No results found for this or any previous visit.

## 2024-05-01 NOTE — CONSULTS
[facility] [place] [facility]  Cardiology  Consult Note    Patient Name: Eric Pink  MRN: [id number]  Admission Date: 5/1/2024  Hospital Length of Stay: 0 days  Code Status: Full Code   Attending Provider: Rin Valdez MD   Consulting Provider: Otto Cabrales MD  Primary Care Physician: Soy Worley MD  Principal Problem:Chest pain    Patient information was obtained from patient and ER records.     Inpatient consult to Cardiology  Consult performed by: Otto Cabrales MD  Consult ordered by: Bon Ogden MD        Subjective:     Chief Complaint:  cp     HPI:   Patient is a pleasant 73-year-old man.  Came in with right-sided chest pain radiating to the back.  CTA was done after premedication for contrast allergy and did not demonstrate any aortic dissection.  Patient has been doing fine.  Denies chest pains at rest on exertion, orthopnea, PND.  EKG with poor R-wave progression.  No evidence of ST elevation MI. currently chest pain-free.            mpression:     1. Redemonstrated mild fusiform dilatation of the ascending thoracic aorta.  Current CTA imaging without definite evidence of acute aortic pathology.  No evidence of dissection or other acute aortic syndrome.  No periaortic inflammation or fluid.  2. Allowing for slight differences technique, otherwise there is no significant change relative to earlier same day unenhanced CT of the chest, abdomen, and pelvis.  3. Redemonstrated marked wall thickening of the esophagus.  Current postcontrast imaging demonstrates mucosal enhancement.  Findings raise the possibility esophagitis.  Retained fluid within the esophagus additional may be seen in the setting of dysmotility.  Recommend clinical correlation.  EGD could be considered to assess for the possibility of dysmotility and/or obstructing mass lesion.  4. Redemonstrated sub 6 mm solid nodule right middle lobe of the lung.  5. Redemonstrated bilateral indeterminate renal lesions, for which  "further characterization with ultrasound versus renal mass protocol MRI or CT would be recommended, if no outside imaging available to confirm stability.  6. Additional details, as provided in the body of the report.    Significant Diagnostics:  I have reviewed all pertinent imaging results/findings within the past 24 hours.  CT CAP WO CON 05/01/24:  Impression:     1. Fusiform dilatation of the ascending thoracic aorta to approximately 43 mm transverse dimension, noting that measurement may be inaccurate due to respiratory motion.  No definite periaortic inflammation or fluid collection identified by noncontrast technique.  No definite crescentic intramural increased attenuation to suggest hematoma.  2. Marked circumferential wall thickening of the esophagus, as may be seen in setting of esophagitis.  Clinical correlation recommended in this regard.  3. Indeterminate renal lesions at the right lower pole and left upper pole, for which further characterization with ultrasound versus dedicated renal mass protocol MRI or CT would be recommended.  4. Additional details of chronic and incidental findings, as provided in the body of report.       History of Present Illness: 73 y.o. M who has a PMHx of GERD, HTN, Seizure (4 weeks ago) and Stroke (4 years ago) who presented to the ED via EMS transportation for emergent evaluation of acute right sided Chest pressure and pain that radiates to the back. Pt describes the CP as a pressure-like sensation and heaviness. Pt states "I think I am having a heart attack." Per chart review Pt was administered 3 sublingual Nitroglycerin tablets. Pt rated the CP a 7/10 during the initial onset. He states that the CP improved after receiving Nitroglycerin and rated the CP a 4/10. Of note pt reports residual right sided weakness, and speech difficulty since the Stroke. Vascular surgery was consulted for further evaluation.       Past Medical History:   Diagnosis Date    GERD " (gastroesophageal reflux disease)     Hypertension     Stroke        Past Surgical History:   Procedure Laterality Date    GASTRIC RESTRICTION SURGERY      TELE-STROKE LABS         Review of patient's allergies indicates:   Allergen Reactions    Iodine Anaphylaxis     Pt premedicated prior to contrast with no distress noted.     Betadine surgi-prep Blisters    Shellfish containing products Hives       Current Facility-Administered Medications   Medication Dose Route Frequency Provider Last Rate Last Admin    acetaminophen tablet 650 mg  650 mg Oral Q4H PRN Tito Porter PA-C        glucagon (human recombinant) injection 1 mg  1 mg Intramuscular PRN ShowTito soto PA-C        glucose chewable tablet 16 g  16 g Oral PRN ShowTito soto PA-C        glucose chewable tablet 24 g  24 g Oral PRN ShowTito soto PA-C        magnesium oxide tablet 800 mg  800 mg Oral PRN ShowTito soto PA-C        magnesium oxide tablet 800 mg  800 mg Oral PRN ShowTito soto PA-C        melatonin tablet 6 mg  6 mg Oral Nightly PRN ShowTito soto PA-C        naloxone 0.4 mg/mL injection 0.02 mg  0.02 mg Intravenous PRN ShowTito soto PA-C        potassium bicarbonate disintegrating tablet 50 mEq  50 mEq Oral ED 1 Time Bon Ogden MD        senna-docusate 8.6-50 mg per tablet 1 tablet  1 tablet Oral Daily PRN ShowTito soto PA-C        sodium chloride 0.9% flush 10 mL  10 mL Intravenous PRN ShowTito soto PA-C         Current Outpatient Medications   Medication Sig Dispense Refill    escitalopram oxalate (LEXAPRO) 20 MG tablet Take 20 mg by mouth once daily.      amLODIPine (NORVASC) 10 MG tablet Take 10 mg by mouth once daily.      aspirin (ECOTRIN) 81 MG EC tablet Take 81 mg by mouth once daily.      atorvastatin (LIPITOR) 80 MG tablet Take 80 mg by mouth once daily.  2    cloNIDine 0.1 mg/24 hr td ptwk (CATAPRES) 0.1 mg/24 hr 1 patch every 7 days.      clopidogrel (PLAVIX) 75 mg tablet Take 75 mg by mouth once daily.       HYDROcodone-acetaminophen (NORCO) 5-325 mg per tablet Take 1 tablet by mouth every twelve hours as needed for pain Patient can take 2-3 tablets a day prn pain      levETIRAcetam (KEPPRA) 1000 MG tablet Take 1,000 mg by mouth 2 (two) times daily.      lisinopriL (PRINIVIL,ZESTRIL) 20 MG tablet Take 20 mg by mouth 2 (two) times daily.      morphine (MS CONTIN) 15 MG 12 hr tablet Take 15 mg by mouth 2 (two) times daily.      omeprazole (PRILOSEC OTC) 20 MG tablet Take 20 mg by mouth.      ondansetron (ZOFRAN-ODT) 4 MG TbDL Take 1 tablet (4 mg total) by mouth every 6 (six) hours as needed (nausea). 12 tablet 0    pantoprazole (PROTONIX) 40 MG tablet Take 40 mg by mouth once daily.  3    SF 5000 PLUS 1.1 % Crea Take by mouth.       Family History    None       Tobacco Use    Smoking status: Former    Smokeless tobacco: Former   Substance and Sexual Activity    Alcohol use: No    Drug use: No    Sexual activity: Never     Review of Systems   Constitutional: Negative.   HENT: Negative.     Eyes: Negative.    Cardiovascular:  Positive for chest pain.   Respiratory: Negative.     Endocrine: Negative.    Hematologic/Lymphatic: Negative.    Skin: Negative.    Musculoskeletal: Negative.    Gastrointestinal: Negative.    Genitourinary: Negative.    Neurological: Negative.    Psychiatric/Behavioral: Negative.     Allergic/Immunologic: Negative.      Objective:     Vital Signs (Most Recent):  Temp: 97.6 °F (36.4 °C) (05/01/24 0639)  Pulse: 72 (05/01/24 1452)  Resp: 15 (05/01/24 1452)  BP: 131/65 (05/01/24 1452)  SpO2: 99 % (05/01/24 1452) Vital Signs (24h Range):  Temp:  [97.6 °F (36.4 °C)] 97.6 °F (36.4 °C)  Pulse:  [] 72  Resp:  [12-22] 15  SpO2:  [95 %-100 %] 99 %  BP: (105-173)/(55-77) 131/65     Weight: 74.8 kg (165 lb)  Body mass index is 26.63 kg/m².    SpO2: 99 %       No intake or output data in the 24 hours ending 05/01/24 1617    Lines/Drains/Airways       Peripheral Intravenous Line  Duration                   Peripheral IV - Single Lumen 05/01/24 1005 20 G Distal;Posterior;Right Forearm <1 day         Peripheral IV - Single Lumen 05/01/24 20 G Left Antecubital <1 day                     Physical Exam  Vitals reviewed.   Constitutional:       Appearance: He is well-developed.   HENT:      Head: Normocephalic.   Eyes:      Conjunctiva/sclera: Conjunctivae normal.      Pupils: Pupils are equal, round, and reactive to light.   Cardiovascular:      Rate and Rhythm: Normal rate and regular rhythm.      Heart sounds: Normal heart sounds.   Pulmonary:      Effort: Pulmonary effort is normal.      Breath sounds: Normal breath sounds.   Abdominal:      General: Bowel sounds are normal.      Palpations: Abdomen is soft.   Musculoskeletal:      Cervical back: Normal range of motion and neck supple.   Skin:     General: Skin is warm.   Neurological:      Mental Status: He is alert and oriented to person, place, and time.          Significant Labs:     DATA:     Laboratory:  CBC:  Recent Labs   Lab 05/01/24  0721   WBC 8.66   Hemoglobin 10.5 L   Hematocrit 30.7 L   Platelets 166       CHEMISTRIES:  Recent Labs   Lab 01/15/22  0432 06/07/23  1430 05/01/24  0721   Glucose  --   --  90   Sodium 136 139 143   Potassium 3.7 3.9 2.9 L   BUN 14.9 17.1 11   Creatinine 0.79 1.03 0.7   eGFR  103 76 L  --    Calcium 8.7 9.9 7.8 L   Magnesium  --   --  1.1 L       CARDIAC BIOMARKERS:  Recent Labs   Lab 01/15/22  0432 04/25/24  1641 05/01/24  0721 05/01/24  1244    84  --   --    Troponin I  --   --  <0.006 <0.006       COAGS:  Recent Labs   Lab 03/19/24  1216 03/20/24  0517 03/23/24  0446   INR 1.0 1.0 1.0       LIPIDS/LFTS:  Recent Labs   Lab 01/15/22  0432 06/07/23  1430 05/01/24  0721   AST 16 17 14   ALT 17 24 7 L       Hemoglobin A1C   Date Value Ref Range Status   01/15/2022 5.3 <5.7 % Final       TSH  Recent Labs   Lab 03/21/24  0327   TSH 2.78       The ASCVD Risk score (Leslie DK, et al., 2019) failed to calculate  for the following reasons:    The patient has a prior MI or stroke diagnosis       BNP    Lab Results   Component Value Date/Time    BNP <10 05/01/2024 07:21 AM            ECHO    No results found for this or any previous visit.      STRESS TEST    No results found for this or any previous visit.        CATH    No results found for this or any previous visit.      Assessment and Plan:     * Chest pain  Patient with right-sided chest pain.  Troponins negative.  Multiple risk factors for coronary artery disease.  Check stress test in a.m. check 2D echo        History of CVA (cerebrovascular accident)        Ascending aortic aneurysm  S/p cta. No dissection. Mild dilatation        VTE Risk Mitigation (From admission, onward)           Ordered     Place SANDEE hose  Until discontinued         05/01/24 1515     IP VTE HIGH RISK PATIENT  Once         05/01/24 1515     Place sequential compression device  Until discontinued         05/01/24 1515                    Thank you for your consult. I will follow-up with patient. Please contact us if you have any additional questions.    Otto Cabrales MD  Cardiology   [facility] - [place] - Emergency Dept

## 2024-05-02 LAB
ALBUMIN SERPL BCP-MCNC: 4.2 G/DL (ref 3.5–5.2)
ALP SERPL-CCNC: 64 U/L (ref 55–135)
ALT SERPL W/O P-5'-P-CCNC: 14 U/L (ref 10–44)
ANION GAP SERPL CALC-SCNC: 14 MMOL/L (ref 8–16)
ANION GAP SERPL CALC-SCNC: 9 MMOL/L (ref 8–16)
AST SERPL-CCNC: 15 U/L (ref 10–40)
BASOPHILS # BLD AUTO: 0.02 K/UL (ref 0–0.2)
BASOPHILS # BLD AUTO: 0.02 K/UL (ref 0–0.2)
BASOPHILS NFR BLD: 0.2 % (ref 0–1.9)
BASOPHILS NFR BLD: 0.2 % (ref 0–1.9)
BILIRUB SERPL-MCNC: 0.7 MG/DL (ref 0.1–1)
BUN SERPL-MCNC: 25 MG/DL (ref 8–23)
BUN SERPL-MCNC: 32 MG/DL (ref 8–23)
CALCIUM SERPL-MCNC: 10.9 MG/DL (ref 8.7–10.5)
CALCIUM SERPL-MCNC: 9.8 MG/DL (ref 8.7–10.5)
CHLORIDE SERPL-SCNC: 103 MMOL/L (ref 95–110)
CHLORIDE SERPL-SCNC: 104 MMOL/L (ref 95–110)
CO2 SERPL-SCNC: 22 MMOL/L (ref 23–29)
CO2 SERPL-SCNC: 23 MMOL/L (ref 23–29)
CREAT SERPL-MCNC: 0.9 MG/DL (ref 0.5–1.4)
CREAT SERPL-MCNC: 1 MG/DL (ref 0.5–1.4)
CV STRESS BASE HR: 95 BPM
DIASTOLIC BLOOD PRESSURE: 79 MMHG
DIFFERENTIAL METHOD BLD: ABNORMAL
DIFFERENTIAL METHOD BLD: ABNORMAL
EOSINOPHIL # BLD AUTO: 0 K/UL (ref 0–0.5)
EOSINOPHIL # BLD AUTO: 0 K/UL (ref 0–0.5)
EOSINOPHIL NFR BLD: 0 % (ref 0–8)
EOSINOPHIL NFR BLD: 0.1 % (ref 0–8)
ERYTHROCYTE [DISTWIDTH] IN BLOOD BY AUTOMATED COUNT: 13.2 % (ref 11.5–14.5)
ERYTHROCYTE [DISTWIDTH] IN BLOOD BY AUTOMATED COUNT: 13.2 % (ref 11.5–14.5)
EST. GFR  (NO RACE VARIABLE): >60 ML/MIN/1.73 M^2
EST. GFR  (NO RACE VARIABLE): >60 ML/MIN/1.73 M^2
GLUCOSE SERPL-MCNC: 101 MG/DL (ref 70–110)
GLUCOSE SERPL-MCNC: 107 MG/DL (ref 70–110)
HCT VFR BLD AUTO: 39.9 % (ref 40–54)
HCT VFR BLD AUTO: 48.9 % (ref 40–54)
HGB BLD-MCNC: 13.9 G/DL (ref 14–18)
HGB BLD-MCNC: 16 G/DL (ref 14–18)
IMM GRANULOCYTES # BLD AUTO: 0.03 K/UL (ref 0–0.04)
IMM GRANULOCYTES # BLD AUTO: 0.05 K/UL (ref 0–0.04)
IMM GRANULOCYTES NFR BLD AUTO: 0.3 % (ref 0–0.5)
IMM GRANULOCYTES NFR BLD AUTO: 0.4 % (ref 0–0.5)
LYMPHOCYTES # BLD AUTO: 1.6 K/UL (ref 1–4.8)
LYMPHOCYTES # BLD AUTO: 1.8 K/UL (ref 1–4.8)
LYMPHOCYTES NFR BLD: 12.4 % (ref 18–48)
LYMPHOCYTES NFR BLD: 16 % (ref 18–48)
MAGNESIUM SERPL-MCNC: 1.9 MG/DL (ref 1.6–2.6)
MCH RBC QN AUTO: 31.1 PG (ref 27–31)
MCH RBC QN AUTO: 31.8 PG (ref 27–31)
MCHC RBC AUTO-ENTMCNC: 32.7 G/DL (ref 32–36)
MCHC RBC AUTO-ENTMCNC: 34.8 G/DL (ref 32–36)
MCV RBC AUTO: 91 FL (ref 82–98)
MCV RBC AUTO: 95 FL (ref 82–98)
MONOCYTES # BLD AUTO: 0.4 K/UL (ref 0.3–1)
MONOCYTES # BLD AUTO: 0.9 K/UL (ref 0.3–1)
MONOCYTES NFR BLD: 3.4 % (ref 4–15)
MONOCYTES NFR BLD: 8.5 % (ref 4–15)
NEUTROPHILS # BLD AUTO: 10.6 K/UL (ref 1.8–7.7)
NEUTROPHILS # BLD AUTO: 8.3 K/UL (ref 1.8–7.7)
NEUTROPHILS NFR BLD: 74.9 % (ref 38–73)
NEUTROPHILS NFR BLD: 83.6 % (ref 38–73)
NRBC BLD-RTO: 0 /100 WBC
NRBC BLD-RTO: 0 /100 WBC
NUC REST EJECTION FRACTION: 84
OHS CV CPX 85 PERCENT MAX PREDICTED HEART RATE MALE: 123
OHS CV CPX MAX PREDICTED HEART RATE: 145
OHS CV CPX PATIENT IS FEMALE: 0
OHS CV CPX PATIENT IS MALE: 1
OHS CV CPX PEAK DIASTOLIC BLOOD PRESSURE: 78 MMHG
OHS CV CPX PEAK HEAR RATE: 97 BPM
OHS CV CPX PEAK RATE PRESSURE PRODUCT: NORMAL
OHS CV CPX PEAK SYSTOLIC BLOOD PRESSURE: 144 MMHG
OHS CV CPX PERCENT MAX PREDICTED HEART RATE ACHIEVED: 67
OHS CV CPX RATE PRESSURE PRODUCT PRESENTING: NORMAL
OHS QRS DURATION: 68 MS
OHS QTC CALCULATION: 403 MS
PHOSPHATE SERPL-MCNC: 3.5 MG/DL (ref 2.7–4.5)
PLATELET # BLD AUTO: 229 K/UL (ref 150–450)
PLATELET # BLD AUTO: 300 K/UL (ref 150–450)
PMV BLD AUTO: 10 FL (ref 9.2–12.9)
PMV BLD AUTO: 10.3 FL (ref 9.2–12.9)
POTASSIUM SERPL-SCNC: 4.3 MMOL/L (ref 3.5–5.1)
POTASSIUM SERPL-SCNC: 5.3 MMOL/L (ref 3.5–5.1)
PROT SERPL-MCNC: 7.5 G/DL (ref 6–8.4)
RBC # BLD AUTO: 4.37 M/UL (ref 4.6–6.2)
RBC # BLD AUTO: 5.14 M/UL (ref 4.6–6.2)
SODIUM SERPL-SCNC: 136 MMOL/L (ref 136–145)
SODIUM SERPL-SCNC: 139 MMOL/L (ref 136–145)
SYSTOLIC BLOOD PRESSURE: 134 MMHG
WBC # BLD AUTO: 11.01 K/UL (ref 3.9–12.7)
WBC # BLD AUTO: 12.67 K/UL (ref 3.9–12.7)

## 2024-05-02 PROCEDURE — 36415 COLL VENOUS BLD VENIPUNCTURE: CPT | Performed by: INTERNAL MEDICINE

## 2024-05-02 PROCEDURE — 84100 ASSAY OF PHOSPHORUS: CPT | Performed by: PHYSICIAN ASSISTANT

## 2024-05-02 PROCEDURE — G0378 HOSPITAL OBSERVATION PER HR: HCPCS

## 2024-05-02 PROCEDURE — 63600175 PHARM REV CODE 636 W HCPCS: Performed by: PHYSICIAN ASSISTANT

## 2024-05-02 PROCEDURE — C9113 INJ PANTOPRAZOLE SODIUM, VIA: HCPCS | Performed by: PHYSICIAN ASSISTANT

## 2024-05-02 PROCEDURE — 80048 BASIC METABOLIC PNL TOTAL CA: CPT | Mod: XB | Performed by: INTERNAL MEDICINE

## 2024-05-02 PROCEDURE — 83735 ASSAY OF MAGNESIUM: CPT | Performed by: PHYSICIAN ASSISTANT

## 2024-05-02 PROCEDURE — 63600175 PHARM REV CODE 636 W HCPCS: Performed by: INTERNAL MEDICINE

## 2024-05-02 PROCEDURE — 25000003 PHARM REV CODE 250: Performed by: PHYSICIAN ASSISTANT

## 2024-05-02 PROCEDURE — 25000003 PHARM REV CODE 250: Performed by: NURSE PRACTITIONER

## 2024-05-02 PROCEDURE — 85025 COMPLETE CBC W/AUTO DIFF WBC: CPT | Performed by: PHYSICIAN ASSISTANT

## 2024-05-02 PROCEDURE — 96376 TX/PRO/DX INJ SAME DRUG ADON: CPT | Mod: 59

## 2024-05-02 PROCEDURE — 99233 SBSQ HOSP IP/OBS HIGH 50: CPT | Mod: 25,,, | Performed by: INTERNAL MEDICINE

## 2024-05-02 PROCEDURE — 36415 COLL VENOUS BLD VENIPUNCTURE: CPT | Performed by: PHYSICIAN ASSISTANT

## 2024-05-02 PROCEDURE — 94761 N-INVAS EAR/PLS OXIMETRY MLT: CPT

## 2024-05-02 PROCEDURE — 85025 COMPLETE CBC W/AUTO DIFF WBC: CPT | Mod: 91 | Performed by: INTERNAL MEDICINE

## 2024-05-02 PROCEDURE — A9502 TC99M TETROFOSMIN: HCPCS | Performed by: HOSPITALIST

## 2024-05-02 PROCEDURE — 80053 COMPREHEN METABOLIC PANEL: CPT | Performed by: PHYSICIAN ASSISTANT

## 2024-05-02 PROCEDURE — 25000003 PHARM REV CODE 250: Performed by: INTERNAL MEDICINE

## 2024-05-02 PROCEDURE — 96361 HYDRATE IV INFUSION ADD-ON: CPT

## 2024-05-02 RX ORDER — FAMOTIDINE 20 MG/1
20 TABLET, FILM COATED ORAL 2 TIMES DAILY
Status: DISCONTINUED | OUTPATIENT
Start: 2024-05-02 | End: 2024-05-02

## 2024-05-02 RX ORDER — REGADENOSON 0.08 MG/ML
0.4 INJECTION, SOLUTION INTRAVENOUS ONCE
Status: COMPLETED | OUTPATIENT
Start: 2024-05-02 | End: 2024-05-02

## 2024-05-02 RX ORDER — ALUMINUM HYDROXIDE, MAGNESIUM HYDROXIDE, AND SIMETHICONE 1200; 120; 1200 MG/30ML; MG/30ML; MG/30ML
30 SUSPENSION ORAL ONCE
Status: COMPLETED | OUTPATIENT
Start: 2024-05-02 | End: 2024-05-02

## 2024-05-02 RX ORDER — CLOPIDOGREL BISULFATE 300 MG/1
300 TABLET, FILM COATED ORAL ONCE
Status: COMPLETED | OUTPATIENT
Start: 2024-05-02 | End: 2024-05-02

## 2024-05-02 RX ORDER — PREDNISONE 50 MG/1
50 TABLET ORAL EVERY 6 HOURS
Status: COMPLETED | OUTPATIENT
Start: 2024-05-03 | End: 2024-05-03

## 2024-05-02 RX ORDER — DIPHENHYDRAMINE HCL 25 MG
50 CAPSULE ORAL ONCE
Status: DISCONTINUED | OUTPATIENT
Start: 2024-05-02 | End: 2024-05-02

## 2024-05-02 RX ORDER — DIPHENHYDRAMINE HCL 25 MG
50 CAPSULE ORAL
Status: DISCONTINUED | OUTPATIENT
Start: 2024-05-03 | End: 2024-05-03 | Stop reason: HOSPADM

## 2024-05-02 RX ORDER — HYDROCODONE BITARTRATE AND ACETAMINOPHEN 5; 325 MG/1; MG/1
1 TABLET ORAL EVERY 8 HOURS PRN
Status: DISCONTINUED | OUTPATIENT
Start: 2024-05-02 | End: 2024-05-03 | Stop reason: HOSPADM

## 2024-05-02 RX ORDER — SODIUM CHLORIDE 9 MG/ML
INJECTION, SOLUTION INTRAVENOUS CONTINUOUS
Status: DISCONTINUED | OUTPATIENT
Start: 2024-05-02 | End: 2024-05-03 | Stop reason: HOSPADM

## 2024-05-02 RX ORDER — FAMOTIDINE 20 MG/1
20 TABLET, FILM COATED ORAL EVERY 6 HOURS SCHEDULED
Status: COMPLETED | OUTPATIENT
Start: 2024-05-03 | End: 2024-05-03

## 2024-05-02 RX ORDER — DIPHENHYDRAMINE HCL 25 MG
50 CAPSULE ORAL EVERY 6 HOURS
Status: COMPLETED | OUTPATIENT
Start: 2024-05-03 | End: 2024-05-03

## 2024-05-02 RX ADMIN — ALUMINUM HYDROXIDE, MAGNESIUM HYDROXIDE, AND SIMETHICONE 30 ML: 200; 200; 20 SUSPENSION ORAL at 03:05

## 2024-05-02 RX ADMIN — DIPHENHYDRAMINE HYDROCHLORIDE 50 MG: 25 CAPSULE ORAL at 11:05

## 2024-05-02 RX ADMIN — LEVETIRACETAM 1000 MG: 500 TABLET, FILM COATED ORAL at 09:05

## 2024-05-02 RX ADMIN — LISINOPRIL 20 MG: 20 TABLET ORAL at 08:05

## 2024-05-02 RX ADMIN — SODIUM CHLORIDE: 9 INJECTION, SOLUTION INTRAVENOUS at 11:05

## 2024-05-02 RX ADMIN — HYDROCODONE BITARTRATE AND ACETAMINOPHEN 1 TABLET: 5; 325 TABLET ORAL at 09:05

## 2024-05-02 RX ADMIN — REGADENOSON 0.4 MG: 0.08 INJECTION, SOLUTION INTRAVENOUS at 11:05

## 2024-05-02 RX ADMIN — ALUMINUM HYDROXIDE, MAGNESIUM HYDROXIDE, AND SIMETHICONE 30 ML: 200; 200; 20 SUSPENSION ORAL at 09:05

## 2024-05-02 RX ADMIN — ASPIRIN 81 MG: 81 TABLET, COATED ORAL at 09:05

## 2024-05-02 RX ADMIN — TETROFOSMIN 29.5 MILLICURIE: 1.38 INJECTION, POWDER, LYOPHILIZED, FOR SOLUTION INTRAVENOUS at 11:05

## 2024-05-02 RX ADMIN — CLOPIDOGREL BISULFATE 75 MG: 75 TABLET ORAL at 09:05

## 2024-05-02 RX ADMIN — PREDNISONE 50 MG: 50 TABLET ORAL at 11:05

## 2024-05-02 RX ADMIN — LISINOPRIL 20 MG: 20 TABLET ORAL at 09:05

## 2024-05-02 RX ADMIN — AMLODIPINE BESYLATE 10 MG: 5 TABLET ORAL at 09:05

## 2024-05-02 RX ADMIN — CLOPIDOGREL BISULFATE 300 MG: 300 TABLET, FILM COATED ORAL at 09:05

## 2024-05-02 RX ADMIN — ATORVASTATIN CALCIUM 80 MG: 40 TABLET, FILM COATED ORAL at 09:05

## 2024-05-02 RX ADMIN — FAMOTIDINE 20 MG: 20 TABLET ORAL at 11:05

## 2024-05-02 RX ADMIN — PANTOPRAZOLE SODIUM 40 MG: 40 INJECTION, POWDER, FOR SOLUTION INTRAVENOUS at 09:05

## 2024-05-02 RX ADMIN — TETROFOSMIN 9.5 MILLICURIE: 1.38 INJECTION, POWDER, LYOPHILIZED, FOR SOLUTION INTRAVENOUS at 10:05

## 2024-05-02 RX ADMIN — Medication 6 MG: at 11:05

## 2024-05-02 RX ADMIN — LEVETIRACETAM 1000 MG: 500 TABLET, FILM COATED ORAL at 08:05

## 2024-05-02 RX ADMIN — HYDROCODONE BITARTRATE AND ACETAMINOPHEN 1 TABLET: 5; 325 TABLET ORAL at 05:05

## 2024-05-02 NOTE — SUBJECTIVE & OBJECTIVE
Interval History:  No more chest pains today    Results for orders placed during the hospital encounter of 05/01/24    Nuclear Stress - Cardiology Interpreted    Interpretation Summary    Abnormal myocardial perfusion scan.    There is a mostly fixed perfusion abnormality with some reversibilty.    The gated perfusion images showed an ejection fraction of 84% at rest.    There is normal wall motion at rest and post stress.    The ECG portion of the study is negative for ischemia.    The patient reported no chest pain during the stress test.    During stress, occasional PVCs are noted.      Review of Systems   Constitutional: Negative.   HENT: Negative.     Eyes: Negative.    Endocrine: Negative.    Hematologic/Lymphatic: Negative.    Skin: Negative.    Musculoskeletal: Negative.    Gastrointestinal: Negative.    Genitourinary: Negative.    Neurological: Negative.    Psychiatric/Behavioral: Negative.     Allergic/Immunologic: Negative.      Objective:     Vital Signs (Most Recent):  Temp: 97.7 °F (36.5 °C) (05/02/24 1547)  Pulse: 79 (05/02/24 1547)  Resp: 18 (05/02/24 1714)  BP: 129/60 (05/02/24 1547)  SpO2: 99 % (05/02/24 1547) Vital Signs (24h Range):  Temp:  [97.7 °F (36.5 °C)-98.6 °F (37 °C)] 97.7 °F (36.5 °C)  Pulse:  [] 79  Resp:  [16-20] 18  SpO2:  [97 %-99 %] 99 %  BP: (129-167)/(60-83) 129/60     Weight: 68 kg (149 lb 14.6 oz)  Body mass index is 24.2 kg/m².     SpO2: 99 %         Intake/Output Summary (Last 24 hours) at 5/2/2024 1854  Last data filed at 5/2/2024 1714  Gross per 24 hour   Intake 120 ml   Output 150 ml   Net -30 ml       Lines/Drains/Airways       Peripheral Intravenous Line  Duration                  Peripheral IV - Single Lumen 05/01/24 1005 20 G Distal;Posterior;Right Forearm 1 day         Peripheral IV - Single Lumen 05/01/24 20 G Left Antecubital 1 day                       Physical Exam  Vitals reviewed.   Constitutional:       Appearance: He is well-developed.   HENT:      Head:  Normocephalic.   Eyes:      Conjunctiva/sclera: Conjunctivae normal.      Pupils: Pupils are equal, round, and reactive to light.   Cardiovascular:      Rate and Rhythm: Normal rate and regular rhythm.      Heart sounds: Normal heart sounds.   Pulmonary:      Effort: Pulmonary effort is normal.      Breath sounds: Normal breath sounds.   Abdominal:      General: Bowel sounds are normal.      Palpations: Abdomen is soft.   Musculoskeletal:      Cervical back: Normal range of motion and neck supple.   Skin:     General: Skin is warm.   Neurological:      Mental Status: He is alert and oriented to person, place, and time.            Significant Labs:      DATA:     Laboratory:  CBC:  Recent Labs   Lab 05/01/24  0721 05/02/24  0450   WBC 8.66 12.67   Hemoglobin 10.5 L 16.0   Hematocrit 30.7 L 48.9   Platelets 166 300       CHEMISTRIES:  Recent Labs   Lab 01/15/22  0432 06/07/23  1430 05/01/24  0721 05/02/24  0450   Glucose  --   --  90 107   Sodium 136 139 143 139   Potassium 3.7 3.9 2.9 L 5.3 H   BUN 14.9 17.1 11 25 H   Creatinine 0.79 1.03 0.7 1.0   eGFR  103 76 L  --   --    Calcium 8.7 9.9 7.8 L 10.9 H   Magnesium  --   --  1.1 L 1.9       CARDIAC BIOMARKERS:  Recent Labs   Lab 01/15/22  0432 04/25/24  1641 05/01/24  0721 05/01/24  1244 05/01/24  1801 05/01/24  2131    84  --   --   --   --    Troponin I  --   --    < > <0.006 <0.006 <0.006    < > = values in this interval not displayed.       COAGS:  Recent Labs   Lab 03/19/24  1216 03/20/24  0517 03/23/24  0446   INR 1.0 1.0 1.0       LIPIDS/LFTS:  Recent Labs   Lab 06/07/23  1430 05/01/24  0721 05/02/24  0450   AST 17 14 15   ALT 24 7 L 14       Hemoglobin A1C   Date Value Ref Range Status   01/15/2022 5.3 <5.7 % Final       TSH  Recent Labs   Lab 03/21/24  0327   TSH 2.78       The ASCVD Risk score (Tyro DK, et al., 2019) failed to calculate for the following reasons:    The patient has a prior MI or stroke diagnosis       BNP    Lab  Results   Component Value Date/Time    BNP <10 05/01/2024 07:21 AM            ECHO    Results for orders placed during the hospital encounter of 05/01/24    Echo    Interpretation Summary    Left Ventricle: Normal wall thickness. There is concentric remodeling. There is normal systolic function with a visually estimated ejection fraction of 60 - 65%. Grade I diastolic dysfunction.    Right Atrium: Right atrium is mildly dilated.    Aortic Valve: There is mild stenosis. Aortic valve area by VTI is 1.78 cm². Aortic valve peak velocity is 2.39 m/s. Mean gradient is 16 mmHg. The dimensionless index is 0.70. There is mild aortic regurgitation.    Aorta: Aortic root is mildly dilated measuring 3.91 cm. Ascending aorta is mildly dilated measuring 3.97 cm.    Pulmonary Artery: The estimated pulmonary artery systolic pressure is 26 mmHg.    IVC/SVC: Normal venous pressure at 3 mmHg.      STRESS TEST    Results for orders placed during the hospital encounter of 05/01/24    Nuclear Stress - Cardiology Interpreted    Interpretation Summary    Abnormal myocardial perfusion scan.    There is a mostly fixed perfusion abnormality with some reversibilty.    The gated perfusion images showed an ejection fraction of 84% at rest.    There is normal wall motion at rest and post stress.    The ECG portion of the study is negative for ischemia.    The patient reported no chest pain during the stress test.    During stress, occasional PVCs are noted.        CATH    No results found for this or any previous visit.

## 2024-05-02 NOTE — PROGRESS NOTES
[facility] - [place] - [facility]  Cardiology  Progress Note    Patient Name: Eric Pink  MRN: [id number]  Admission Date: 5/1/2024  Hospital Length of Stay: 0 days  Code Status: Full Code   Attending Physician: Rin Valdez MD   Primary Care Physician: Soy Worley MD  Expected Discharge Date: 5/2/2024  Principal Problem:Chest pain    Subjective:       Interval History:  No more chest pains today    Results for orders placed during the hospital encounter of 05/01/24    Nuclear Stress - Cardiology Interpreted    Interpretation Summary    Abnormal myocardial perfusion scan.    There is a mostly fixed perfusion abnormality with some reversibilty.    The gated perfusion images showed an ejection fraction of 84% at rest.    There is normal wall motion at rest and post stress.    The ECG portion of the study is negative for ischemia.    The patient reported no chest pain during the stress test.    During stress, occasional PVCs are noted.      Review of Systems   Constitutional: Negative.   HENT: Negative.     Eyes: Negative.    Endocrine: Negative.    Hematologic/Lymphatic: Negative.    Skin: Negative.    Musculoskeletal: Negative.    Gastrointestinal: Negative.    Genitourinary: Negative.    Neurological: Negative.    Psychiatric/Behavioral: Negative.     Allergic/Immunologic: Negative.      Objective:     Vital Signs (Most Recent):  Temp: 97.7 °F (36.5 °C) (05/02/24 1547)  Pulse: 79 (05/02/24 1547)  Resp: 18 (05/02/24 1714)  BP: 129/60 (05/02/24 1547)  SpO2: 99 % (05/02/24 1547) Vital Signs (24h Range):  Temp:  [97.7 °F (36.5 °C)-98.6 °F (37 °C)] 97.7 °F (36.5 °C)  Pulse:  [] 79  Resp:  [16-20] 18  SpO2:  [97 %-99 %] 99 %  BP: (129-167)/(60-83) 129/60     Weight: 68 kg (149 lb 14.6 oz)  Body mass index is 24.2 kg/m².     SpO2: 99 %         Intake/Output Summary (Last 24 hours) at 5/2/2024 1854  Last data filed at 5/2/2024 1714  Gross per 24 hour   Intake 120 ml   Output 150 ml   Net -30 ml        Lines/Drains/Airways       Peripheral Intravenous Line  Duration                  Peripheral IV - Single Lumen 05/01/24 1005 20 G Distal;Posterior;Right Forearm 1 day         Peripheral IV - Single Lumen 05/01/24 20 G Left Antecubital 1 day                       Physical Exam  Vitals reviewed.   Constitutional:       Appearance: He is well-developed.   HENT:      Head: Normocephalic.   Eyes:      Conjunctiva/sclera: Conjunctivae normal.      Pupils: Pupils are equal, round, and reactive to light.   Cardiovascular:      Rate and Rhythm: Normal rate and regular rhythm.      Heart sounds: Normal heart sounds.   Pulmonary:      Effort: Pulmonary effort is normal.      Breath sounds: Normal breath sounds.   Abdominal:      General: Bowel sounds are normal.      Palpations: Abdomen is soft.   Musculoskeletal:      Cervical back: Normal range of motion and neck supple.   Skin:     General: Skin is warm.   Neurological:      Mental Status: He is alert and oriented to person, place, and time.            Significant Labs:      DATA:     Laboratory:  CBC:  Recent Labs   Lab 05/01/24  0721 05/02/24  0450   WBC 8.66 12.67   Hemoglobin 10.5 L 16.0   Hematocrit 30.7 L 48.9   Platelets 166 300       CHEMISTRIES:  Recent Labs   Lab 01/15/22  0432 06/07/23  1430 05/01/24  0721 05/02/24  0450   Glucose  --   --  90 107   Sodium 136 139 143 139   Potassium 3.7 3.9 2.9 L 5.3 H   BUN 14.9 17.1 11 25 H   Creatinine 0.79 1.03 0.7 1.0   eGFR  103 76 L  --   --    Calcium 8.7 9.9 7.8 L 10.9 H   Magnesium  --   --  1.1 L 1.9       CARDIAC BIOMARKERS:  Recent Labs   Lab 01/15/22  0432 04/25/24  1641 05/01/24  0721 05/01/24  1244 05/01/24  1801 05/01/24  2131    84  --   --   --   --    Troponin I  --   --    < > <0.006 <0.006 <0.006    < > = values in this interval not displayed.       COAGS:  Recent Labs   Lab 03/19/24  1216 03/20/24  0517 03/23/24  0446   INR 1.0 1.0 1.0       LIPIDS/LFTS:  Recent Labs   Lab  06/07/23  1430 05/01/24  0721 05/02/24  0450   AST 17 14 15   ALT 24 7 L 14       Hemoglobin A1C   Date Value Ref Range Status   01/15/2022 5.3 <5.7 % Final       TSH  Recent Labs   Lab 03/21/24  0327   TSH 2.78       The ASCVD Risk score (Leslie LAZARO, et al., 2019) failed to calculate for the following reasons:    The patient has a prior MI or stroke diagnosis       BNP    Lab Results   Component Value Date/Time    BNP <10 05/01/2024 07:21 AM            ECHO    Results for orders placed during the hospital encounter of 05/01/24    Echo    Interpretation Summary    Left Ventricle: Normal wall thickness. There is concentric remodeling. There is normal systolic function with a visually estimated ejection fraction of 60 - 65%. Grade I diastolic dysfunction.    Right Atrium: Right atrium is mildly dilated.    Aortic Valve: There is mild stenosis. Aortic valve area by VTI is 1.78 cm². Aortic valve peak velocity is 2.39 m/s. Mean gradient is 16 mmHg. The dimensionless index is 0.70. There is mild aortic regurgitation.    Aorta: Aortic root is mildly dilated measuring 3.91 cm. Ascending aorta is mildly dilated measuring 3.97 cm.    Pulmonary Artery: The estimated pulmonary artery systolic pressure is 26 mmHg.    IVC/SVC: Normal venous pressure at 3 mmHg.      STRESS TEST    Results for orders placed during the hospital encounter of 05/01/24    Nuclear Stress - Cardiology Interpreted    Interpretation Summary    Abnormal myocardial perfusion scan.    There is a mostly fixed perfusion abnormality with some reversibilty.    The gated perfusion images showed an ejection fraction of 84% at rest.    There is normal wall motion at rest and post stress.    The ECG portion of the study is negative for ischemia.    The patient reported no chest pain during the stress test.    During stress, occasional PVCs are noted.        CATH    No results found for this or any previous visit.      Assessment and Plan:     Brief HPI:     * Chest  pain  Patient with right-sided chest pain.  Troponins negative.  Multiple risk factors for coronary artery disease.  Check stress test in a.m. check 2D echo    5/2:  Abnormal stress test, predominantly fixed wall defect with some reversibility.  Will discuss with patient further evaluation with the help of coronary angiogram versus intensifying medical management.        History of CVA (cerebrovascular accident)        Ascending aortic aneurysm  S/p cta. No dissection. Mild dilatation        VTE Risk Mitigation (From admission, onward)           Ordered     Place SANDEE hose  Until discontinued         05/01/24 1515     IP VTE HIGH RISK PATIENT  Once         05/01/24 1515     Place sequential compression device  Until discontinued         05/01/24 1515                    Otto Cabrales MD  Cardiology  [facility] - [place] - Observation

## 2024-05-02 NOTE — ASSESSMENT & PLAN NOTE
Patient with right-sided chest pain.  Troponins negative.  Multiple risk factors for coronary artery disease.  Check stress test in a.m. check 2D echo    5/2:  Abnormal stress test, predominantly fixed wall defect.  Will discuss with patient further evaluation with the help of coronary angiogram versus intensifying medical management.

## 2024-05-02 NOTE — NURSING
[facility], [facility]  Nurses Note -- 4 Eyes      5/2/2024       Skin assessed on: Admit      [x] No Pressure Injuries Present    [x]Prevention Measures Documented    [] Yes LDA  for Pressure Injury Previously documented     [] Yes New Pressure Injury Discovered   [] LDA for New Pressure Injury Added      Attending RN:  Conrad Claudio RN     Second RN:  Jacy Clemons RN

## 2024-05-02 NOTE — PLAN OF CARE
Problem: Adult Inpatient Plan of Care  Goal: Plan of Care Review  Outcome: Progressing  Goal: Absence of Hospital-Acquired Illness or Injury  Outcome: Progressing  Goal: Optimal Comfort and Wellbeing  Outcome: Progressing     Patient is AAOx4. On room air. Tele maintained. No falls or injuries reported during shift, safety precautions maintained.

## 2024-05-02 NOTE — NURSING
Patient came to the unit via wheelchair accompanied by a transporter, on room air and no apparent distress noted. Oriented on his room and got situated on  bed. Put bed in lowest position, with HOB elevated. Side rails raised, instructed to call for assistance.Vital signs taken. Four eyes completed.

## 2024-05-02 NOTE — PLAN OF CARE
05/02/24 1503   Final Note   Assessment Type Final Discharge Note   Anticipated Discharge Disposition Home   Hospital Resources/Appts/Education Provided Appointments scheduled and added to AVS   Post-Acute Status   Post-Acute Authorization Other   Other Status No Post-Acute Service Needs     Pts nurse Lela notified that the pt can d/c from CM standpoint

## 2024-05-03 VITALS
HEART RATE: 60 BPM | TEMPERATURE: 98 F | RESPIRATION RATE: 18 BRPM | DIASTOLIC BLOOD PRESSURE: 62 MMHG | SYSTOLIC BLOOD PRESSURE: 133 MMHG | BODY MASS INDEX: 24.87 KG/M2 | OXYGEN SATURATION: 99 % | WEIGHT: 154.75 LBS | HEIGHT: 66 IN

## 2024-05-03 PROCEDURE — 63600175 PHARM REV CODE 636 W HCPCS: Performed by: PHYSICIAN ASSISTANT

## 2024-05-03 PROCEDURE — C9113 INJ PANTOPRAZOLE SODIUM, VIA: HCPCS | Performed by: PHYSICIAN ASSISTANT

## 2024-05-03 PROCEDURE — 25000003 PHARM REV CODE 250: Performed by: INTERNAL MEDICINE

## 2024-05-03 PROCEDURE — 93005 ELECTROCARDIOGRAM TRACING: CPT

## 2024-05-03 PROCEDURE — 25000003 PHARM REV CODE 250: Performed by: NURSE PRACTITIONER

## 2024-05-03 PROCEDURE — C1887 CATHETER, GUIDING: HCPCS | Performed by: INTERNAL MEDICINE

## 2024-05-03 PROCEDURE — 93010 ELECTROCARDIOGRAM REPORT: CPT | Mod: ,,, | Performed by: INTERNAL MEDICINE

## 2024-05-03 PROCEDURE — 63600175 PHARM REV CODE 636 W HCPCS: Performed by: INTERNAL MEDICINE

## 2024-05-03 PROCEDURE — G0378 HOSPITAL OBSERVATION PER HR: HCPCS

## 2024-05-03 PROCEDURE — 99153 MOD SED SAME PHYS/QHP EA: CPT | Performed by: INTERNAL MEDICINE

## 2024-05-03 PROCEDURE — C1894 INTRO/SHEATH, NON-LASER: HCPCS | Performed by: INTERNAL MEDICINE

## 2024-05-03 PROCEDURE — 99152 MOD SED SAME PHYS/QHP 5/>YRS: CPT | Performed by: INTERNAL MEDICINE

## 2024-05-03 PROCEDURE — 25500020 PHARM REV CODE 255: Performed by: INTERNAL MEDICINE

## 2024-05-03 PROCEDURE — 93458 L HRT ARTERY/VENTRICLE ANGIO: CPT | Performed by: INTERNAL MEDICINE

## 2024-05-03 PROCEDURE — 25000003 PHARM REV CODE 250: Performed by: PHYSICIAN ASSISTANT

## 2024-05-03 PROCEDURE — A4216 STERILE WATER/SALINE, 10 ML: HCPCS | Performed by: INTERNAL MEDICINE

## 2024-05-03 PROCEDURE — 93458 L HRT ARTERY/VENTRICLE ANGIO: CPT | Mod: 26,,, | Performed by: INTERNAL MEDICINE

## 2024-05-03 PROCEDURE — 96361 HYDRATE IV INFUSION ADD-ON: CPT

## 2024-05-03 PROCEDURE — 96376 TX/PRO/DX INJ SAME DRUG ADON: CPT | Mod: 59

## 2024-05-03 PROCEDURE — 99152 MOD SED SAME PHYS/QHP 5/>YRS: CPT | Mod: ,,, | Performed by: INTERNAL MEDICINE

## 2024-05-03 RX ORDER — ACETAMINOPHEN 325 MG/1
650 TABLET ORAL EVERY 4 HOURS PRN
Status: DISCONTINUED | OUTPATIENT
Start: 2024-05-03 | End: 2024-05-03 | Stop reason: SDUPTHER

## 2024-05-03 RX ORDER — MORPHINE SULFATE 4 MG/ML
3 INJECTION, SOLUTION INTRAMUSCULAR; INTRAVENOUS
Status: DISCONTINUED | OUTPATIENT
Start: 2024-05-03 | End: 2024-05-03 | Stop reason: HOSPADM

## 2024-05-03 RX ORDER — MIDAZOLAM HYDROCHLORIDE 1 MG/ML
INJECTION, SOLUTION INTRAMUSCULAR; INTRAVENOUS
Status: DISCONTINUED | OUTPATIENT
Start: 2024-05-03 | End: 2024-05-03 | Stop reason: HOSPADM

## 2024-05-03 RX ORDER — LIDOCAINE HYDROCHLORIDE 10 MG/ML
INJECTION, SOLUTION EPIDURAL; INFILTRATION; INTRACAUDAL; PERINEURAL
Status: DISCONTINUED | OUTPATIENT
Start: 2024-05-03 | End: 2024-05-03 | Stop reason: HOSPADM

## 2024-05-03 RX ORDER — FENTANYL CITRATE 50 UG/ML
INJECTION, SOLUTION INTRAMUSCULAR; INTRAVENOUS
Status: DISCONTINUED | OUTPATIENT
Start: 2024-05-03 | End: 2024-05-03 | Stop reason: HOSPADM

## 2024-05-03 RX ORDER — OXYCODONE HYDROCHLORIDE 5 MG/1
5 TABLET ORAL EVERY 4 HOURS PRN
Status: DISCONTINUED | OUTPATIENT
Start: 2024-05-03 | End: 2024-05-03 | Stop reason: HOSPADM

## 2024-05-03 RX ORDER — ONDANSETRON HYDROCHLORIDE 2 MG/ML
4 INJECTION, SOLUTION INTRAVENOUS EVERY 12 HOURS PRN
Status: DISCONTINUED | OUTPATIENT
Start: 2024-05-03 | End: 2024-05-03 | Stop reason: HOSPADM

## 2024-05-03 RX ORDER — SODIUM CHLORIDE 9 MG/ML
INJECTION, SOLUTION INTRAMUSCULAR; INTRAVENOUS; SUBCUTANEOUS
Status: DISCONTINUED | OUTPATIENT
Start: 2024-05-03 | End: 2024-05-03 | Stop reason: HOSPADM

## 2024-05-03 RX ADMIN — LISINOPRIL 20 MG: 20 TABLET ORAL at 09:05

## 2024-05-03 RX ADMIN — AMLODIPINE BESYLATE 10 MG: 5 TABLET ORAL at 09:05

## 2024-05-03 RX ADMIN — SODIUM CHLORIDE: 9 INJECTION, SOLUTION INTRAVENOUS at 10:05

## 2024-05-03 RX ADMIN — LEVETIRACETAM 1000 MG: 500 TABLET, FILM COATED ORAL at 09:05

## 2024-05-03 RX ADMIN — DIPHENHYDRAMINE HYDROCHLORIDE 50 MG: 25 CAPSULE ORAL at 06:05

## 2024-05-03 RX ADMIN — ASPIRIN 81 MG: 81 TABLET, COATED ORAL at 09:05

## 2024-05-03 RX ADMIN — DIPHENHYDRAMINE HYDROCHLORIDE 50 MG: 25 CAPSULE ORAL at 11:05

## 2024-05-03 RX ADMIN — PREDNISONE 50 MG: 50 TABLET ORAL at 11:05

## 2024-05-03 RX ADMIN — PREDNISONE 50 MG: 50 TABLET ORAL at 06:05

## 2024-05-03 RX ADMIN — ATORVASTATIN CALCIUM 80 MG: 40 TABLET, FILM COATED ORAL at 09:05

## 2024-05-03 RX ADMIN — HYDROCODONE BITARTRATE AND ACETAMINOPHEN 1 TABLET: 5; 325 TABLET ORAL at 02:05

## 2024-05-03 RX ADMIN — HYDROCODONE BITARTRATE AND ACETAMINOPHEN 1 TABLET: 5; 325 TABLET ORAL at 10:05

## 2024-05-03 RX ADMIN — FAMOTIDINE 20 MG: 20 TABLET ORAL at 11:05

## 2024-05-03 RX ADMIN — PANTOPRAZOLE SODIUM 40 MG: 40 INJECTION, POWDER, FOR SOLUTION INTRAVENOUS at 09:05

## 2024-05-03 RX ADMIN — FAMOTIDINE 20 MG: 20 TABLET ORAL at 06:05

## 2024-05-03 RX ADMIN — CLOPIDOGREL BISULFATE 75 MG: 75 TABLET ORAL at 09:05

## 2024-05-03 NOTE — ASSESSMENT & PLAN NOTE
Presents with CP, CTA with evidence of dilation, though no acute pathology. Troponin negative EKG without ST elevation  CT with marked wall thickening of the esophagus, possibly related to GERD/esophagitis- continue PPI  Troponin trend negative and BNP negative.    2D echo showed normal EF 60- 65 %,  grade 1 diastolic dysfunction and normal wall motion.  Abnormal stress test.  Cardiology to discuss with patient angiogram versus medical management.  Continue aspirin Plavix and statin.

## 2024-05-03 NOTE — NURSING
[facility], [facility] - [place]  Nurses Note -- 4 Eyes      5/2/2024       Skin assessed on: Q Shift      [] No Pressure Injuries Present    []Prevention Measures Documented    [] Yes LDA  for Pressure Injury Previously documented     [] Yes New Pressure Injury Discovered   [] LDA for New Pressure Injury Added      Attending RN:  Jacy Clemons RN     Second RN:  Lela GARCIA RN

## 2024-05-03 NOTE — NURSING
Discharge Preparation:  Note that patient awake, alert, fully oriented and denies pain at this time.   Patient dressed and reports that he is ready for discharge.  Case management coordination of service complete.    Two IV's removed with no bleeding to insertion site.  Telemetry monitor removed.    Will review discharge orders with this patient.     Will contact Hospital Transport for w/c to parking area.

## 2024-05-03 NOTE — SUBJECTIVE & OBJECTIVE
Interval History: patient seen and examined. Angiogram today. NAD noted     Review of Systems   Constitutional:  Negative for chills and fever.   HENT:  Negative for nosebleeds and tinnitus.    Eyes:  Negative for photophobia and visual disturbance.   Respiratory:  Negative for shortness of breath and wheezing.    Cardiovascular:  Positive for chest pain. Negative for palpitations and leg swelling.   Gastrointestinal:  Negative for abdominal distention, nausea and vomiting.   Genitourinary:  Negative for dysuria, flank pain and hematuria.   Musculoskeletal:  Negative for gait problem and joint swelling.   Skin:  Negative for rash and wound.   Neurological:  Negative for seizures and syncope.     Objective:     Vital Signs (Most Recent):  Temp: 97.9 °F (36.6 °C) (05/03/24 0730)  Pulse: 64 (05/03/24 0730)  Resp: 16 (05/03/24 1038)  BP: (!) 143/70 (05/03/24 0918)  SpO2: 97 % (05/03/24 0730) Vital Signs (24h Range):  Temp:  [97.7 °F (36.5 °C)-98.1 °F (36.7 °C)] 97.9 °F (36.6 °C)  Pulse:  [] 64  Resp:  [16-19] 16  SpO2:  [96 %-99 %] 97 %  BP: (129-152)/(60-76) 143/70     Weight: 70.2 kg (154 lb 12.2 oz)  Body mass index is 24.98 kg/m².    Intake/Output Summary (Last 24 hours) at 5/3/2024 1051  Last data filed at 5/3/2024 0619  Gross per 24 hour   Intake 680 ml   Output 1150 ml   Net -470 ml         Physical Exam  Vitals and nursing note reviewed.   Constitutional:       General: He is not in acute distress.     Appearance: He is well-developed. He is not diaphoretic.   Eyes:      General:         Right eye: No discharge.         Left eye: No discharge.      Conjunctiva/sclera: Conjunctivae normal.   Neck:      Thyroid: No thyromegaly.   Cardiovascular:      Rate and Rhythm: Normal rate and regular rhythm.      Heart sounds: No murmur heard.  Pulmonary:      Effort: Pulmonary effort is normal. No respiratory distress.      Breath sounds: Normal breath sounds.   Abdominal:      General: Bowel sounds are normal. There  is no distension.      Palpations: Abdomen is soft. There is no mass.      Tenderness: There is no abdominal tenderness.   Musculoskeletal:         General: No deformity.      Cervical back: Normal range of motion and neck supple.      Right lower leg: No edema.      Left lower leg: No edema.   Skin:     General: Skin is warm and dry.   Neurological:      Mental Status: He is alert and oriented to person, place, and time.      Sensory: No sensory deficit.   Psychiatric:         Mood and Affect: Mood normal.         Behavior: Behavior normal.             Significant Labs: All pertinent labs within the past 24 hours have been reviewed.    Significant Imaging: I have reviewed all pertinent imaging results/findings within the past 24 hours.

## 2024-05-03 NOTE — NURSING
[facility], [facility] - [place]  Nurses Note -- 4 Eyes      5/3/2024       Skin assessed on: Q Shift      [x] No Pressure Injuries Present    [x]Prevention Measures Documented    [] Yes LDA  for Pressure Injury Previously documented     [] Yes New Pressure Injury Discovered   [] LDA for New Pressure Injury Added      Attending RN:  Tara Medina RN     Second RN:  JAQUAN Louie

## 2024-05-03 NOTE — PLAN OF CARE
Problem: Adult Inpatient Plan of Care  Goal: Plan of Care Review  Outcome: Met  Goal: Patient-Specific Goal (Individualized)  Outcome: Met  Goal: Absence of Hospital-Acquired Illness or Injury  Outcome: Met  Goal: Optimal Comfort and Wellbeing  Outcome: Met  Goal: Readiness for Transition of Care  Outcome: Met     Problem: Chest Pain  Goal: Resolution of Chest Pain Symptoms  Outcome: Met     Problem: Wound  Goal: Optimal Coping  Outcome: Met  Goal: Optimal Functional Ability  Outcome: Met  Goal: Absence of Infection Signs and Symptoms  Outcome: Met  Goal: Improved Oral Intake  Outcome: Met  Goal: Optimal Pain Control and Function  Outcome: Met  Goal: Skin Health and Integrity  Outcome: Met  Goal: Optimal Wound Healing  Outcome: Met     Problem: Wound  Goal: Optimal Coping  Outcome: Met  Goal: Optimal Functional Ability  Outcome: Met  Goal: Absence of Infection Signs and Symptoms  Outcome: Met  Goal: Improved Oral Intake  Outcome: Met  Goal: Optimal Pain Control and Function  Outcome: Met  Goal: Skin Health and Integrity  Outcome: Met  Goal: Optimal Wound Healing  Outcome: Met

## 2024-05-03 NOTE — SUBJECTIVE & OBJECTIVE
Past Medical History:   Diagnosis Date    GERD (gastroesophageal reflux disease)     Hypertension     Seizures     Stroke        Past Surgical History:   Procedure Laterality Date    GASTRIC RESTRICTION SURGERY      TELE-STROKE LABS         Review of patient's allergies indicates:   Allergen Reactions    Iodine Anaphylaxis     Pt premedicated prior to contrast with no distress noted.     Betadine surgi-prep Blisters    Shellfish containing products Hives       Current Facility-Administered Medications   Medication Dose Route Frequency Provider Last Rate Last Admin    0.9%  NaCl infusion   Intravenous Continuous Otto Cabrales MD        acetaminophen tablet 650 mg  650 mg Oral Q4H PRN Tito Porter PA-C        amLODIPine tablet 10 mg  10 mg Oral Daily ShowTito soto PA-C   10 mg at 05/02/24 0911    aspirin EC tablet 81 mg  81 mg Oral Daily Tito Porter PA-C   81 mg at 05/02/24 0911    atorvastatin tablet 80 mg  80 mg Oral Daily ShowTito soto PA-C   80 mg at 05/02/24 0911    clopidogreL tablet 75 mg  75 mg Oral Daily ShowTito soto PA-C   75 mg at 05/02/24 0911    [START ON 5/3/2024] diphenhydrAMINE capsule 50 mg  50 mg Oral Q6H Otto Cabrales MD        [START ON 5/3/2024] diphenhydrAMINE capsule 50 mg  50 mg Oral On Call Procedure Otto Cabrales MD        [START ON 5/3/2024] famotidine tablet 20 mg  20 mg Oral 4 times per day Otto Cabrales MD        glucagon (human recombinant) injection 1 mg  1 mg Intramuscular PRN Tito Porter PA-C        glucose chewable tablet 16 g  16 g Oral PRN Tito Porter PA-C        glucose chewable tablet 24 g  24 g Oral PRN ShowTito soto PA-C        HYDROcodone-acetaminophen 5-325 mg per tablet 1 tablet  1 tablet Oral Q8H PRN Katharina Forde, NP   1 tablet at 05/02/24 1714    levETIRAcetam tablet 1,000 mg  1,000 mg Oral BID Tito Porter PA-C   1,000 mg at 05/02/24 2039    lisinopriL tablet 20 mg  20 mg Oral BID Tito Porter PA-C   20 mg at  05/02/24 2039    magnesium oxide tablet 800 mg  800 mg Oral PRN ShowTito soto PA-C        magnesium oxide tablet 800 mg  800 mg Oral PRN ShowTito soto PA-C        melatonin tablet 6 mg  6 mg Oral Nightly PRN ShowTito soto PA-C        naloxone 0.4 mg/mL injection 0.02 mg  0.02 mg Intravenous PRN ShowTito soto PA-C        pantoprazole injection 40 mg  40 mg Intravenous Daily ShowTito soto PA-C   40 mg at 05/02/24 0911    [START ON 5/3/2024] predniSONE tablet 50 mg  50 mg Oral Q6H KeronOtto MD        senna-docusate 8.6-50 mg per tablet 1 tablet  1 tablet Oral Daily PRN ShowTito soto PA-C        sodium chloride 0.9% flush 10 mL  10 mL Intravenous PRN ShowTito soto PA-C         Family History    None       Tobacco Use    Smoking status: Former    Smokeless tobacco: Former   Substance and Sexual Activity    Alcohol use: No    Drug use: No    Sexual activity: Never     Review of Systems   Constitutional:  Negative for chills and fever.   HENT:  Negative for nosebleeds and tinnitus.    Eyes:  Negative for photophobia and visual disturbance.   Respiratory:  Negative for shortness of breath and wheezing.    Cardiovascular:  Positive for chest pain. Negative for palpitations and leg swelling.   Gastrointestinal:  Negative for abdominal distention, nausea and vomiting.   Genitourinary:  Negative for dysuria, flank pain and hematuria.   Musculoskeletal:  Negative for gait problem and joint swelling.   Skin:  Negative for rash and wound.   Neurological:  Negative for seizures and syncope.     Objective:     Vital Signs (Most Recent):  Temp: 98.1 °F (36.7 °C) (05/02/24 1930)  Pulse: 90 (05/02/24 1933)  Resp: 18 (05/02/24 1930)  BP: (!) 144/76 (05/02/24 1930)  SpO2: 96 % (05/02/24 2039) Vital Signs (24h Range):  Temp:  [97.7 °F (36.5 °C)-98.3 °F (36.8 °C)] 98.1 °F (36.7 °C)  Pulse:  [] 90  Resp:  [18-20] 18  SpO2:  [96 %-99 %] 96 %  BP: (129-161)/(60-83) 144/76     Weight: 68 kg (149 lb 14.6  oz)  Body mass index is 24.2 kg/m².     Physical Exam  Vitals and nursing note reviewed.   Constitutional:       General: He is not in acute distress.     Appearance: He is well-developed. He is not diaphoretic.   Eyes:      General:         Right eye: No discharge.         Left eye: No discharge.      Conjunctiva/sclera: Conjunctivae normal.   Neck:      Thyroid: No thyromegaly.   Cardiovascular:      Rate and Rhythm: Normal rate and regular rhythm.      Heart sounds: No murmur heard.  Pulmonary:      Effort: Pulmonary effort is normal. No respiratory distress.      Breath sounds: Normal breath sounds.   Abdominal:      General: Bowel sounds are normal. There is no distension.      Palpations: Abdomen is soft. There is no mass.      Tenderness: There is no abdominal tenderness.   Musculoskeletal:         General: No deformity.      Cervical back: Normal range of motion and neck supple.      Right lower leg: No edema.      Left lower leg: No edema.   Skin:     General: Skin is warm and dry.   Neurological:      Mental Status: He is alert and oriented to person, place, and time.      Sensory: No sensory deficit.   Psychiatric:         Mood and Affect: Mood normal.         Behavior: Behavior normal.                Significant Labs: All pertinent labs within the past 24 hours have been reviewed.    Significant Imaging: I have reviewed all pertinent imaging results/findings within the past 24 hours.

## 2024-05-03 NOTE — PLAN OF CARE
Problem: Adult Inpatient Plan of Care  Goal: Plan of Care Review  Flowsheets (Taken 5/3/2024 0325)  Plan of Care Reviewed With: patient  Goal: Absence of Hospital-Acquired Illness or Injury  Intervention: Identify and Manage Fall Risk  Flowsheets (Taken 5/3/2024 0325)  Safety Promotion/Fall Prevention:   Fall Risk reviewed with patient/family   lighting adjusted   medications reviewed   room near unit station   instructed to call staff for mobility  Intervention: Prevent Skin Injury  Flowsheets (Taken 5/3/2024 0325)  Body Position: position changed independently  Intervention: Prevent and Manage VTE (Venous Thromboembolism) Risk  Flowsheets (Taken 5/3/2024 0325)  VTE Prevention/Management: ROM (active) performed  Goal: Optimal Comfort and Wellbeing  Intervention: Monitor Pain and Promote Comfort  Flowsheets (Taken 5/3/2024 0325)  Pain Management Interventions: pain management plan reviewed with patient/caregiver  Intervention: Provide Person-Centered Care  Flowsheets (Taken 5/3/2024 0325)  Trust Relationship/Rapport:   care explained   questions answered   questions encouraged   thoughts/feelings acknowledged     Problem: Chest Pain  Goal: Resolution of Chest Pain Symptoms  Intervention: Manage Acute Chest Pain  Flowsheets (Taken 5/3/2024 0325)  Chest Pain Intervention: ([facility] scheduled)   cardiac biomarkers drawn   cardiac monitoring continued   nitroglycerin SL given   other (see comments)

## 2024-05-03 NOTE — PROGRESS NOTES
[facility] Medicine  Progress Note    Patient Name: Eric Pink  MRN: [id number]  Patient Class: OP- Observation   Admission Date: 5/1/2024  Length of Stay: 0 days  Attending Physician: Rin Valdez MD  Primary Care Provider: Soy Worley MD        Subjective:     Principal Problem:Chest pain        HPI:  Eric Pink 73 y.o. male with HTN, HLD, GERD presents to the hospital with a chief complaint of chest pain.  He reports he developed sudden-onset chest pain he was sternum described as a building pressure that began at 1:00 a.m..  It resolved with morphine in the ER.  He attempted no treatments at home.  It was associated with shortness of breath and nausea.  He denies fever vomiting abdominal pain leg swelling melena hematuria hematemesis dizziness and syncope.  Reports he has gastric reflux, but the pain felt different.    In the ED, afebrile without leukocytosis, troponin negative, CTA with dilation of the ascending thoracic aorta without evidence of acute aortic pathology, marked wall thickening of the esophagus.     Overview/Hospital Course:  Admission to the hospital for chest pain.  Troponin trend negative and BNP negative.  2D echo showed normal EF 60- 65 %,  grade 1 diastolic dysfunction and normal wall motion.  Abnormal stress test.  Cardiology to discuss with patient angiogram versus medical management.  Continue aspirin Plavix and statin.    Past Medical History:   Diagnosis Date    GERD (gastroesophageal reflux disease)     Hypertension     Seizures     Stroke        Past Surgical History:   Procedure Laterality Date    GASTRIC RESTRICTION SURGERY      TELE-STROKE LABS         Review of patient's allergies indicates:   Allergen Reactions    Iodine Anaphylaxis     Pt premedicated prior to contrast with no distress noted.     Betadine surgi-prep Blisters    Shellfish containing products Hives       Current Facility-Administered Medications   Medication Dose  Route Frequency Provider Last Rate Last Admin    0.9%  NaCl infusion   Intravenous Continuous Otto Cabrales MD        acetaminophen tablet 650 mg  650 mg Oral Q4H PRN Tito Porter PA-C        amLODIPine tablet 10 mg  10 mg Oral Daily ShowTito soto PA-C   10 mg at 05/02/24 0911    aspirin EC tablet 81 mg  81 mg Oral Daily ShowTito soto PA-C   81 mg at 05/02/24 0911    atorvastatin tablet 80 mg  80 mg Oral Daily ShowTito soto PA-C   80 mg at 05/02/24 0911    clopidogreL tablet 75 mg  75 mg Oral Daily ShowTito soto PA-C   75 mg at 05/02/24 0911    [START ON 5/3/2024] diphenhydrAMINE capsule 50 mg  50 mg Oral Q6H KeronOtto russ MD        [START ON 5/3/2024] diphenhydrAMINE capsule 50 mg  50 mg Oral On Call Procedure Otto Cabrales MD        [START ON 5/3/2024] famotidine tablet 20 mg  20 mg Oral 4 times per day Otto Cabrales MD        glucagon (human recombinant) injection 1 mg  1 mg Intramuscular PRN ShowTito soto PA-C        glucose chewable tablet 16 g  16 g Oral PRN Tito Porter PA-C        glucose chewable tablet 24 g  24 g Oral PRN ShowTito soto PA-C        HYDROcodone-acetaminophen 5-325 mg per tablet 1 tablet  1 tablet Oral Q8H PRN Katharina Forde, NP   1 tablet at 05/02/24 1714    levETIRAcetam tablet 1,000 mg  1,000 mg Oral BID ShowTito soto PA-C   1,000 mg at 05/02/24 2039    lisinopriL tablet 20 mg  20 mg Oral BID Tito Porter PA-C   20 mg at 05/02/24 2039    magnesium oxide tablet 800 mg  800 mg Oral PRN Tito Porter PA-C        magnesium oxide tablet 800 mg  800 mg Oral PRN Tito Porter PA-C        melatonin tablet 6 mg  6 mg Oral Nightly PRN ShowTito soto PA-C        naloxone 0.4 mg/mL injection 0.02 mg  0.02 mg Intravenous PRN Tito Porter PA-C        pantoprazole injection 40 mg  40 mg Intravenous Daily Tito Porter PA-C   40 mg at 05/02/24 0911    [START ON 5/3/2024] predniSONE tablet 50 mg  50 mg Oral Q6H Keron, Otto WILLINGHAM MD         senna-docusate 8.6-50 mg per tablet 1 tablet  1 tablet Oral Daily PRN Tito Porter PA-C        sodium chloride 0.9% flush 10 mL  10 mL Intravenous PRN Tito Porter PA-C         Family History    None       Tobacco Use    Smoking status: Former    Smokeless tobacco: Former   Substance and Sexual Activity    Alcohol use: No    Drug use: No    Sexual activity: Never     Review of Systems   Constitutional:  Negative for chills and fever.   HENT:  Negative for nosebleeds and tinnitus.    Eyes:  Negative for photophobia and visual disturbance.   Respiratory:  Negative for shortness of breath and wheezing.    Cardiovascular:  Positive for chest pain. Negative for palpitations and leg swelling.   Gastrointestinal:  Negative for abdominal distention, nausea and vomiting.   Genitourinary:  Negative for dysuria, flank pain and hematuria.   Musculoskeletal:  Negative for gait problem and joint swelling.   Skin:  Negative for rash and wound.   Neurological:  Negative for seizures and syncope.     Objective:     Vital Signs (Most Recent):  Temp: 98.1 °F (36.7 °C) (05/02/24 1930)  Pulse: 90 (05/02/24 1933)  Resp: 18 (05/02/24 1930)  BP: (!) 144/76 (05/02/24 1930)  SpO2: 96 % (05/02/24 2039) Vital Signs (24h Range):  Temp:  [97.7 °F (36.5 °C)-98.3 °F (36.8 °C)] 98.1 °F (36.7 °C)  Pulse:  [] 90  Resp:  [18-20] 18  SpO2:  [96 %-99 %] 96 %  BP: (129-161)/(60-83) 144/76     Weight: 68 kg (149 lb 14.6 oz)  Body mass index is 24.2 kg/m².     Physical Exam  Vitals and nursing note reviewed.   Constitutional:       General: He is not in acute distress.     Appearance: He is well-developed. He is not diaphoretic.   Eyes:      General:         Right eye: No discharge.         Left eye: No discharge.      Conjunctiva/sclera: Conjunctivae normal.   Neck:      Thyroid: No thyromegaly.   Cardiovascular:      Rate and Rhythm: Normal rate and regular rhythm.      Heart sounds: No murmur heard.  Pulmonary:      Effort: Pulmonary  effort is normal. No respiratory distress.      Breath sounds: Normal breath sounds.   Abdominal:      General: Bowel sounds are normal. There is no distension.      Palpations: Abdomen is soft. There is no mass.      Tenderness: There is no abdominal tenderness.   Musculoskeletal:         General: No deformity.      Cervical back: Normal range of motion and neck supple.      Right lower leg: No edema.      Left lower leg: No edema.   Skin:     General: Skin is warm and dry.   Neurological:      Mental Status: He is alert and oriented to person, place, and time.      Sensory: No sensory deficit.   Psychiatric:         Mood and Affect: Mood normal.         Behavior: Behavior normal.                Significant Labs: All pertinent labs within the past 24 hours have been reviewed.    Significant Imaging: I have reviewed all pertinent imaging results/findings within the past 24 hours.    Assessment/Plan:      * Chest pain  Presents with CP, CTA with evidence of dilation, though no acute pathology. Troponin negative EKG without ST elevation  CT with marked wall thickening of the esophagus, possibly related to GERD/esophagitis- continue PPI  Troponin trend negative and BNP negative.    2D echo showed normal EF 60- 65 %,  grade 1 diastolic dysfunction and normal wall motion.  Abnormal stress test.  Cardiology to discuss with patient angiogram versus medical management.  Continue aspirin Plavix and statin.         History of CVA (cerebrovascular accident)  Reports CVA 4 years ago that effected right side with recovery. Developed seizures 4 months ago, but well controlled no further seizures on keppra  Continue aspirin/statin/plavix  Continue keppra    Hypokalemia  Patient has hypokalemia which is Acute and currently uncontrolled. Most recent potassium levels reviewed-   Lab Results   Component Value Date    K 2.9 (L) 05/01/2024   . Will continue potassium replacement per protocol and recheck repeat levels after replacement  completed.     Potassium of 2.9 on arrival. Mg of 1.1. supplemented in ED. Will repeat     HLD (hyperlipidemia)  Continue home statin    HTN (hypertension)  Chronic, controlled. Latest blood pressure and vitals reviewed-     Temp:  [97.6 °F (36.4 °C)]   Pulse:  []   Resp:  [12-22]   BP: (105-173)/(55-77)   SpO2:  [95 %-100 %] .   Home meds for hypertension were reviewed and noted below.   Hypertension Medications               amLODIPine (NORVASC) 10 MG tablet Take 10 mg by mouth once daily.    cloNIDine 0.1 mg/24 hr td ptwk (CATAPRES) 0.1 mg/24 hr 1 patch every 7 days.    lisinopriL (PRINIVIL,ZESTRIL) 20 MG tablet Take 20 mg by mouth 2 (two) times daily.            While in the hospital, will manage blood pressure as follows; Continue home antihypertensive regimen    Will utilize p.r.n. blood pressure medication only if patient's blood pressure greater than 180/110 and he develops symptoms such as worsening chest pain or shortness of breath.    Ascending aortic aneurysm  Underwent CTA with fusiform dilatation of the ascending thoracic aorta.  Current CTA imaging without definite evidence of acute aortic pathology.  No evidence of dissection or other acute aortic syndrome.  No periaortic inflammation or fluid.   Discussed with CT surgery by ED recommending outpatient follow up      VTE Risk Mitigation (From admission, onward)           Ordered     Place SANDEE hose  Until discontinued         05/01/24 1515     IP VTE HIGH RISK PATIENT  Once         05/01/24 1515     Place sequential compression device  Until discontinued         05/01/24 1515                    Discharge Planning   CARY: 5/2/2024     Code Status: Full Code   Is the patient medically ready for discharge?:     Reason for patient still in hospital (select all that apply): Treatment               Katharina Forde NP  Department of Hospital Medicine   [facility] [place] - Observation

## 2024-05-03 NOTE — NURSING
Pre-procedural iodine prophylaxis medication administration noted and explained to patient. Questions encouraged and answered. Patient verbalized understanding.

## 2024-05-03 NOTE — NURSING
Per handoff received from Lela GARCIA RN. Patient care assumed. Patients overall condition assessed and patient appears to be in NAD with minor complaints of lower back pain. 20g LAC and 20g RFA PIV's are clean, dry, and intact. Call light in reach and patient instructed to inform the nurse if anything is needed. Patient stable and is continually being monitored.

## 2024-05-03 NOTE — NURSING
Note that patient is awake, alert, fully oriented and denies pain at this time.   Last pain med given at 10:38 for c/o back pain.   Maintained NPO.  CHG bath and clip completed on night-shift.     I contacted Helen in the Cath Lab who asked me to give 12:00 meds now. Given.   Expected transport to cath Lab at 12:00 noon.    Informed the patient and will continue to f/u.

## 2024-05-03 NOTE — DISCHARGE SUMMARY
[facility] Medicine  Discharge Summary      Patient Name: Eric Pink  MRN: [id number]  [facility]: [id number]  Patient Class: OP- Observation  Admission Date: 5/1/2024  Hospital Length of Stay: 0 days  Discharge Date and Time:  05/03/2024 1:36 PM  Attending Physician: Rin Valdez MD   Discharging Provider: Almaz Yanez NP  Primary Care Provider: Soy Worley MD    Primary Care Team: Networked reference to record PCT     HPI:   Eric Pink 73 y.o. male with HTN, HLD, GERD presents to the hospital with a chief complaint of chest pain.  He reports he developed sudden-onset chest pain he was sternum described as a building pressure that began at 1:00 a.m..  It resolved with morphine in the ER.  He attempted no treatments at home.  It was associated with shortness of breath and nausea.  He denies fever vomiting abdominal pain leg swelling melena hematuria hematemesis dizziness and syncope.  Reports he has gastric reflux, but the pain felt different.    In the ED, afebrile without leukocytosis, troponin negative, CTA with dilation of the ascending thoracic aorta without evidence of acute aortic pathology, marked wall thickening of the esophagus.     Procedure(s) (LRB):  Left heart cath (Left)      Hospital Course:   Admission to the hospital for chest pain.  Troponin trend negative and BNP negative.  2D echo showed normal EF 60- 65 %,  grade 1 diastolic dysfunction and normal wall motion.  Abnormal stress test. Continue aspirin Plavix and statin.   coronary angiogram:    The pre-procedure left ventricular end diastolic pressure was 4.    The estimated blood loss was <50 mL.    CAD as described below  Left main:  no significant stenosis  LAD: Prox 40-50% stenosis  LCX: Mild non obs disease / MLI  RCA: Mid RCA long 60% stenosis. PLB / PDA small diffusely diseased vessels.  Access: rcfa access 5 fr  Manual pressure for hemostasis  Continue medical rx and aggressive risk factor  modification    Follow up outpatient with PCP and Dr. Cabrales      Goals of Care Treatment Preferences:  Code Status: Full Code      Consults:   Consults (From admission, onward)          Status Ordering Provider     Case Management/  Once        Provider:  (Not yet assigned)    KAYY Moore     Inpatient consult to Cardiology  Once        Provider:  Otto Cabrales MD    Completed MARTHA MOORE     Inpatient consult to Vascular Surgery  Once        Provider:  Kong Stephens MD    Completed MARTHA MOORE            Cardiac/Vascular  * Chest pain  Cardiology  onboard   Presents with CP, CTA with evidence of dilation, though no acute pathology. Troponin negative EKG without ST elevation  CT with marked wall thickening of the esophagus, possibly related to GERD/esophagitis- continue PPI  Troponin trend negative and BNP negative.    2D echo showed normal EF 60- 65 %,  grade 1 diastolic dysfunction and normal wall motion.  Abnormal stress test.  Cardiology   Continue aspirin Plavix and statin.  Coronary angiogram today            Final Active Diagnoses:    Diagnosis Date Noted POA    PRINCIPAL PROBLEM:  Chest pain [R07.9] 05/01/2024 Yes    Ascending aortic aneurysm [I71.21] 05/01/2024 Yes    HTN (hypertension) [I10] 05/01/2024 Yes    HLD (hyperlipidemia) [E78.5] 05/01/2024 Yes    Hypokalemia [E87.6] 05/01/2024 Yes    History of CVA (cerebrovascular accident) [Z86.73] 05/01/2024 Not Applicable      Problems Resolved During this Admission:       Discharged Condition: stable    Disposition: Home or Self Care    Follow Up:   Follow-up Information       Soy Worley MD Follow up on 5/7/2024.    Specialty: Family Medicine  Why: at 1pm  Contact information:  [address] [place] [address]  [id number]  [place] [address] [phone]                           Patient Instructions:      Diet Cardiac     Notify your health care provider if you experience any of the following:  temperature  ">100.4     Notify your health care provider if you experience any of the following:  difficulty breathing or increased cough     Notify your health care provider if you experience any of the following:  persistent dizziness, light-headedness, or visual disturbances     Activity as tolerated       Significant Diagnostic Studies: Labs: BMP:   Recent Labs   Lab 05/02/24  0450 05/02/24  2241    101    136   K 5.3* 4.3    104   CO2 22* 23   BUN 25* 32*   CREATININE 1.0 0.9   CALCIUM 10.9* 9.8   MG 1.9  --    , CMP   Recent Labs   Lab 05/02/24  0450 05/02/24  2241    136   K 5.3* 4.3    104   CO2 22* 23    101   BUN 25* 32*   CREATININE 1.0 0.9   CALCIUM 10.9* 9.8   PROT 7.5  --    ALBUMIN 4.2  --    BILITOT 0.7  --    ALKPHOS 64  --    AST 15  --    ALT 14  --    ANIONGAP 14 9   , CBC   Recent Labs   Lab 05/02/24 0450 05/02/24 2241   WBC 12.67 11.01   HGB 16.0 13.9*   HCT 48.9 39.9*    229   , INR   Lab Results   Component Value Date    INR 1.0 03/23/2024    INR 1.0 03/20/2024    INR 1.0 03/19/2024   , Lipid Panel No results found for: "CHOL", "HDL", "LDLCALC", "TRIG", "CHOLHDL", Troponin   Recent Labs   Lab 05/01/24  1244 05/01/24  1801 05/01/24  2131   TROPONINI <0.006 <0.006 <0.006   , A1C: No results for input(s): "HGBA1C" in the last 4320 hours., and All labs within the past 24 hours have been reviewed    Pending Diagnostic Studies:       Procedure Component Value Units Date/Time    EKG 12-lead [[id number]]     Order Status: Sent Lab Status: No result     Echo [[id number]]     Order Status: Sent Lab Status: No result            Medications:  Reconciled Home Medications:      Medication List        CONTINUE taking these medications      amLODIPine 10 MG tablet  Commonly known as: NORVASC  Take 10 mg by mouth once daily.     aspirin 81 MG EC tablet  Commonly known as: ECOTRIN  Take 81 mg by mouth once daily.     atorvastatin 80 MG tablet  Commonly known as: LIPITOR  Take " 80 mg by mouth once daily.     cloNIDine 0.1 mg/24 hr td ptwk 0.1 mg/24 hr  Commonly known as: CATAPRES  1 patch every 7 days.     clopidogreL 75 mg tablet  Commonly known as: PLAVIX  Take 75 mg by mouth once daily.     EScitalopram oxalate 20 MG tablet  Commonly known as: LEXAPRO  Take 20 mg by mouth once daily.     HYDROcodone-acetaminophen 5-325 mg per tablet  Commonly known as: NORCO  Take 1 tablet by mouth every twelve hours as needed for pain Patient can take 2-3 tablets a day prn pain     levETIRAcetam 1000 MG tablet  Commonly known as: KEPPRA  Take 1,000 mg by mouth 2 (two) times daily.     lisinopriL 20 MG tablet  Commonly known as: PRINIVIL,ZESTRIL  Take 20 mg by mouth 2 (two) times daily.     pantoprazole 40 MG tablet  Commonly known as: PROTONIX  Take 40 mg by mouth once daily.     SF 5000 PLUS 1.1 % Crea  Generic drug: fluoride (sodium)  Take by mouth.            STOP taking these medications      ondansetron 4 MG Tbdl  Commonly known as: ZOFRAN-ODT              Indwelling Lines/Drains at time of discharge:   Lines/Drains/Airways       None                   Time spent on the discharge of patient: 30 minutes         Almaz Yanez NP  Department of Hospital Medicine  [facility] - [place], [place] - Surgery

## 2024-05-03 NOTE — HOSPITAL COURSE
Admission to the hospital for chest pain.  Troponin trend negative and BNP negative.  2D echo showed normal EF 60- 65 %,  grade 1 diastolic dysfunction and normal wall motion.  Abnormal stress test. Continue aspirin Plavix and statin.   coronary angiogram:    The pre-procedure left ventricular end diastolic pressure was 4.    The estimated blood loss was <50 mL.    CAD as described below  Left main:  no significant stenosis  LAD: Prox 40-50% stenosis  LCX: Mild non obs disease / MLI  RCA: Mid RCA long 60% stenosis. PLB / PDA small diffusely diseased vessels.  Access: rcfa access 5 fr  Manual pressure for hemostasis  Continue medical rx and aggressive risk factor modification    Follow up outpatient with PCP and Dr. Cabrales

## 2024-05-03 NOTE — NURSING
Patient has returned from PACU after Cath lab (Cath Lab).  Awake, alert, fully oriented and denies pain at this time.   V/S stable.  Right groin dressing, clean, dry and intact.   BLE warm, no discoloration and pulses palpable.   Patient flat since 13:20. Will sit up at 17:20 (four hours).   At 19:20 (six-hours). Will check patient's status for discharge.    Will continue to f/u.

## 2024-05-03 NOTE — ASSESSMENT & PLAN NOTE
Cardiology  onboard   Presents with CP, CTA with evidence of dilation, though no acute pathology. Troponin negative EKG without ST elevation  CT with marked wall thickening of the esophagus, possibly related to GERD/esophagitis- continue PPI  Troponin trend negative and BNP negative.    2D echo showed normal EF 60- 65 %,  grade 1 diastolic dysfunction and normal wall motion.  Abnormal stress test.  Cardiology   Continue aspirin Plavix and statin.  Coronary angiogram today

## 2024-05-03 NOTE — CARE UPDATE
Had a detailed discussion with the patient about risks benefits of medical management versus coronary angiogram.  After detailed discussion decided to proceed with coronary angiogram for further evaluation.  Because of contrast allergy will do iodine prophylaxis.    Risks, benefits and alternatives of the catheterization procedure were discussed with the patient.The risks of coronary angiography include but are not limited to: bleeding, infection, death, heart attack, arrhythmia, kidney injury or failure, potential need for dialysis, allergic reactions, stroke, need for emergency surgery, hematoma, pseudoaneurysm etc.  Should stenting be indicated, the patient has agreed to dual anti-platelet therapy for 1-consecutive year with a drug-eluting stent and a minimum of 1-month with the use of a bare metal stent. Additionally, pt is aware that non-compliance is likely to result in stent clotting with heart attack, heart failure, and/or death  The risks of moderate sedation include hypotension, respiratory depression, arrhythmias, bronchospasm, and death. Informed consent was obtained and the  patient is agreeable to proceed with the procedure. Consent was placed on the chart.

## (undated) DEVICE — ANGIOTOUCH KIT

## (undated) DEVICE — OMNIPAQUE CONTRAST 350MG/100ML

## (undated) DEVICE — PAD RADI FEMORAL

## (undated) DEVICE — CATH EMPULSE ANGLED 5FR PIGTAI

## (undated) DEVICE — CATH DXTERITY JR40 100CM 5FR

## (undated) DEVICE — INTRODUCER SHEATH 5FR W/.035

## (undated) DEVICE — KIT GLIDESHEATH SLEND 6FR 10CM

## (undated) DEVICE — CATH DXTERITY JL40 100CM 5FR

## (undated) DEVICE — CATH DXTERITY JL50 100CM 5FR

## (undated) DEVICE — KIT MANIFOLD LOW PRESS TUBING

## (undated) DEVICE — PACK CATH LAB

## (undated) DEVICE — CATH DXTERITY JL35 100CM 5FR

## (undated) DEVICE — PAD DEFIB CADENCE ADULT R2

## (undated) DEVICE — KIT SYR REUSABLE